# Patient Record
Sex: FEMALE | Race: WHITE | Employment: OTHER | URBAN - METROPOLITAN AREA
[De-identification: names, ages, dates, MRNs, and addresses within clinical notes are randomized per-mention and may not be internally consistent; named-entity substitution may affect disease eponyms.]

---

## 2018-08-31 ENCOUNTER — TRANSFERRED RECORDS (OUTPATIENT)
Dept: HEALTH INFORMATION MANAGEMENT | Facility: CLINIC | Age: 73
End: 2018-08-31

## 2018-11-29 ENCOUNTER — PRE VISIT (OUTPATIENT)
Dept: ORTHOPEDICS | Facility: CLINIC | Age: 73
End: 2018-11-29

## 2018-11-29 DIAGNOSIS — M25.571 PAIN IN JOINT INVOLVING ANKLE AND FOOT, RIGHT: Primary | ICD-10-CM

## 2018-11-29 NOTE — TELEPHONE ENCOUNTER
FUTURE VISIT INFORMATION      FUTURE VISIT INFORMATION:    Date: 12/3    Time: 1:00    [place] [facility] [place] INFORMATION:    Referring provider:      Referring providers clinic:      Reason for visit/diagnosis  2nd opinion R ankle replacement    RECORDS REQUESTED FROM:       Clinic name Comments Records Status Imaging Status   [facility] Requested records and images 11/28                                     RECORDS RECEIVED FROM: [facility]   DATE RECEIVED: 11/29   NOTES STATUS DETAILS   OFFICE NOTE from referring provider Received 8/31/18   INJECTIONS DONE IN RADIOLOGY Received 8/31/18   MRI N/A    CT SCAN N/A    XRAYS (IMAGES & REPORTS) Received 8/31/18

## 2018-12-03 ENCOUNTER — RADIANT APPOINTMENT (OUTPATIENT)
Dept: GENERAL RADIOLOGY | Facility: CLINIC | Age: 73
End: 2018-12-03
Attending: ORTHOPAEDIC SURGERY
Payer: MEDICARE

## 2018-12-03 ENCOUNTER — OFFICE VISIT (OUTPATIENT)
Dept: ORTHOPEDICS | Facility: CLINIC | Age: 73
End: 2018-12-03
Payer: MEDICARE

## 2018-12-03 VITALS — BODY MASS INDEX: 29.42 KG/M2 | WEIGHT: 176.6 LBS | HEIGHT: 65 IN

## 2018-12-03 DIAGNOSIS — M25.571 PAIN IN JOINT INVOLVING ANKLE AND FOOT, RIGHT: ICD-10-CM

## 2018-12-03 DIAGNOSIS — M19.071 PRIMARY OSTEOARTHRITIS OF RIGHT ANKLE: Primary | ICD-10-CM

## 2018-12-03 ASSESSMENT — ENCOUNTER SYMPTOMS
BACK PAIN: 1
JOINT SWELLING: 1
STIFFNESS: 1
TINGLING: 1
ARTHRALGIAS: 1
NUMBNESS: 1

## 2018-12-03 NOTE — NURSING NOTE
"Reason For Visit:   Chief Complaint   Patient presents with     Right Ankle - Consult       Ht 1.638 m (5' 4.5\")  Wt 80.1 kg (176 lb 9.6 oz)  BMI 29.85 kg/m2    Pain Assessment  Patient Currently in Pain: Yes  0-10 Pain Scale: 6  Primary Pain Location: Ankle  Pain Orientation: Right  Pain Descriptors: Radiating, Sharp  Aggravating Factors: Walking    Krishan Lira ATC  "

## 2018-12-03 NOTE — MR AVS SNAPSHOT
"              After Visit Summary   12/3/2018    Silke Francis    MRN: [id number]           Patient Information     Date Of Birth          1945        Visit Information        Provider Department      12/3/2018 1:00 PM Anson Garrison [facility]        Today's Diagnoses     Primary osteoarthritis of right ankle    -  1       Follow-ups after your visit        Future tests that were ordered for you today     Open Future Orders        Priority Expected Expires Ordered    CT Ankle Right w/o Contrast Routine  12/3/2019 12/3/2018            Who to contact     Please call your clinic at [phone] to:    Ask questions about your health    Make or cancel appointments    Discuss your medicines    Learn about your test results    Speak to your doctor            Additional Information About Your Visit        MyChart Information     Eachpalt is an electronic gateway that provides easy, online access to your medical records. With Ring, you can request a clinic appointment, read your test results, renew a prescription or communicate with your care team.     To sign up for Eachpalt visit the website at [email or web address]   You will be asked to enter the access code listed below, as well as some personal information. Please follow the directions to create your username and password.     Your access code is: [email or web address]  Expires: 2019  6:30 AM     Your access code will  in 90 days. If you need help or a new code, please contact your [facility] or call [phone] for assistance.        Care EveryWhere ID     This is your Care EveryWhere ID. This could be used by other organizations to access your [place] medical records  [id number]        Your Vitals Were     Height BMI (Body Mass Index)                1.638 m (5' 4.5\") 29.85 kg/m2           Blood Pressure from Last 3 Encounters:   No data found for BP    Weight from Last 3 Encounters: "   12/03/18 80.1 kg (176 lb 9.6 oz)              We Performed the Following     Barbara-Operative Worksheet (Default Surgery Request)          Today's Medication Changes          These changes are accurate as of 12/3/18  5:27 PM.  If you have any questions, ask your nurse or doctor.               Start taking these medicines.        Dose/Directions    order for DME   Used for:  Primary osteoarthritis of right ankle   Started by:  Anson Garrison MD        Roll-A-Bout Walker. Patient can use for 4 months   Quantity:  1 Units   Refills:  0            Where to get your medicines      Some of these will need a paper prescription and others can be bought over the counter.  Ask your nurse if you have questions.     Bring a paper prescription for each of these medications     order for DME                Primary Care Provider    None Specified       No primary provider on file.        Equal Access to Services     TRINI NATH : Angelita Chand, wadanielle navarro, qaybyakelin kaalmamaria t olivo, nicci barrera. So [facility] [phone].    ATENCIÓN: Si habla español, tiene a shane disposición servicios gratuitos de asistencia lingüística. Llame al [phone].    We comply with applicable federal civil rights laws and [place] laws. We do not discriminate on the basis of race, color, national origin, age, disability, sex, sexual orientation, or gender identity.            Thank you!     Thank you for choosing [facility]  for your care. Our goal is always to provide you with excellent care. Hearing back from our patients is one way we can continue to improve our services. Please take a few minutes to complete the written survey that you may receive in the mail after your visit with us. Thank you!             Your Updated Medication List - Protect others around you: Learn how to safely use, store and throw away your medicines at www.disposemymeds.org.          This list is accurate  as of 12/3/18  5:27 PM.  Always use your most recent med list.                   Brand Name Dispense Instructions for use Diagnosis    ASPIRIN PO      Take 81 mg by mouth        CELEBREX PO      Take 200 mg by mouth 2 times daily        ESTRADIOL PO      Take 1 mg by mouth        LANSOPRAZOLE PO      Take 30 mg by mouth        METOPROLOL TARTRATE PO      Take 100 mg by mouth        metoprolol-hydrochlorothiazide 25-12.5 MG Tb24 per tablet    DUTOPROL     Take 1 tablet by mouth daily        order for DME     1 Units    Roll-A-Bout Walker. Patient can use for 4 months    Primary osteoarthritis of right ankle       SERTRALINE HCL PO      Take 100 mg by mouth daily        SIMVASTATIN PO      Take 20 mg by mouth

## 2018-12-03 NOTE — PROGRESS NOTES
"CC: right ankle pain    HPI:  Silke is seen as a new patient 2nd opinion for her right ankle pain. She reports worsening right ankle pain for \"her whole life\". She remembers \"breaking her right ankle\" as a kid and being in a cast. She has had multiple injuries to her ankle and \"she rolls it all the time\". She has osteoarthritis to multiple joints and has been tested for autoimmune disease and is negative. She takes celebrex twice daily. She has seen another provider for her ankle this last August who recommended a total ankle replacement and gave her an ankle injection to which she reports \"provided no relief not even initially\". She admits she has no pain while at rest, but immediate pain with weight bearing. Rates her pain \"8 or 9\".     PMH: Reviewed in chart 12/3/2018 and is significant for type II DM, hypertension, high cholesterol and a history of a post op   DVT after her TKA in the past.     ROS: Reviewed on patient tablet 12/3/2018 with all systems negative except for those listed below    PE:  Pleasant and cooperative female alert and oriented x3. Arises from chair slowly. She has significant deformity and herberdens nodes to her fingers. Has bilateral total shoulder replacements that function well and do not cause pain. She is tender to palpate anterior lateral joint space. Mild effusion. Valgus hindfoot malalignment noted from posterior view. Full sensation to feet with pulses palpable. DF: 10 PF 16. Negative anterior drawer. Mild instability noted in inversion with muscle strength 4-/5. Eversion 5/5. Skin intact without rashes, brusies, or lesions. No pain to midfoot eversion/inversion and no signfiicant focal areas of midfoot pain.    Xrays taken today show severe degenerative changes to the tibiotalar joint with lateral talar tilt with no joint space. Significant subchondral sclerosis noted. No signfiicant changes noted to midfoot.    Dx:  1. Right ankle advanced grade IV bone on bone " osteoarthritis--tibiotalar joint    Plan:  1. Dr. Garrison discussed surgical options with Silke and recommended a right total ankle replacement. Risks, benefits, post op complications and prognosis were discussed in detail. She was told she would be limited WB up to 6 weeks and immobilized for that amount of time as well. She will schedule when she is ready to proceed.      Total time spent was 50 minutes with greater than 50% spent in face to face consultation and collaboration of care.  Answers for HPI/ROS submitted by the patient on 12/3/2018   General Symptoms: No  Skin Symptoms: No  HENT Symptoms: No  EYE SYMPTOMS: No  HEART SYMPTOMS: No  LUNG SYMPTOMS: No  INTESTINAL SYMPTOMS: No  URINARY SYMPTOMS: No  GYNECOLOGIC SYMPTOMS: No  BREAST SYMPTOMS: No  SKELETAL SYMPTOMS: Yes  BLOOD SYMPTOMS: No  NERVOUS SYSTEM SYMPTOMS: Yes  MENTAL HEALTH SYMPTOMS: No  Back pain: Yes  Swollen joints: Yes  Joint pain: Yes  Joint stiffness: Yes  Tingling: Yes  Difficulty walking: Yes  Numbness: Yes    I, Dr. Anson Garrison, have seen and examined this patient with KEVIN Knight, CNP.

## 2018-12-03 NOTE — NURSING NOTE
Teaching Flowsheet   Relevant Diagnosis: osteoarthritis, right ankle  Teaching Topic: preop RTAA with tendoachilles lengthening    Silke lives alone in [place] with family nearby to help as needed.  Stopped smoking 30 yrs ago, retired, history bilateral TSA 2013 and 2015, Rt TKA 2011, spinal fusions L4-5 and L5-S1 in 2013.  She requested knee scooter for her postop time when no walking on her total ankle due to hand arthritis and inability to use crutches or walker, Rx given to pt which she will take to [facility] facility.  Preop CT was ordered.  Pt will call to schedule as it gets closer to her May 2019 surgery date.     Person(s) involved in teaching:   Patient and Niece     Motivation Level:  Asks Questions: Yes  Eager to Learn: Yes  Cooperative: Yes  Receptive (willing/able to accept information): Yes  Any cultural factors/Rastafarian beliefs that may influence understanding or compliance? No  Comments:      Patient and Family demonstrates understanding of the following:  Reason for the appointment, diagnosis and treatment plan: Yes  Knowledge of proper use of medications and conditions for which they are ordered (with special attention to potential side effects or drug interactions): Yes  Which situations necessitate calling provider and whom to contact: Yes       Teaching Concerns Addressed:   Comments:      Proper use and care of ortho boot (medical equip, care aids, etc.): Yes  Nutritional needs and diet plan: Yes  Pain management techniques: Yes  Wound Care: Yes  How and/when to access community resources: Yes     Instructional Materials Used/Given: preop pkt TAA handout, antiseptic soap     Time spent with patient: 30 minutes.

## 2018-12-03 NOTE — LETTER
Date:December 4, 2018      Patient was self referred, no letter generated. Do not send.        [facility] Information

## 2018-12-03 NOTE — LETTER
"12/3/2018       RE: Silke Francis  [address] [facility] [address]     Dear Colleague,    Thank you for referring your patient, Silke Francis, [facility]. Please see a copy of my visit note below.    CC: right ankle pain    HPI:  Silke is seen as a new patient 2nd opinion for her right ankle pain. She reports worsening right ankle pain for \"her whole life\". She remembers \"breaking her right ankle\" as a kid and being in a cast. She has had multiple injuries to her ankle and \"she rolls it all the time\". She has osteoarthritis to multiple joints and has been tested for autoimmune disease and is negative. She takes celebrex twice daily. She has seen another provider for her ankle this last August who recommended a total ankle replacement and gave her an ankle injection to which she reports \"provided no relief not even initially\". She admits she has no pain while at rest, but immediate pain with weight bearing. Rates her pain \"8 or 9\".     PMH: Reviewed in chart 12/3/2018 and is significant for type II DM, hypertension, high cholesterol and a history of a post op   DVT after her TKA in the past.     ROS: Reviewed on patient tablet 12/3/2018 with all systems negative except for those listed below    PE:  Pleasant and cooperative female alert and oriented x3. Arises from chair slowly. She has significant deformity and herberdens nodes to her fingers. Has bilateral total shoulder replacements that function well and do not cause pain. She is tender to palpate anterior lateral joint space. Mild effusion. Valgus hindfoot malalignment noted from posterior view. Full sensation to feet with pulses palpable. DF: 10 PF 16. Negative anterior drawer. Mild instability noted in inversion with muscle strength 4-/5. Eversion 5/5. Skin intact without rashes, brusies, or lesions. No pain to midfoot eversion/inversion and no signfiicant focal areas of " midfoot pain.    Xrays taken today show severe degenerative changes to the tibiotalar joint with lateral talar tilt with no joint space. Significant subchondral sclerosis noted. No signfiicant changes noted to midfoot.    Dx:  1. Right ankle advanced grade IV bone on bone osteoarthritis--tibiotalar joint    Plan:  1. Dr. Garrison discussed surgical options with Silke and recommended a right total ankle replacement. Risks, benefits, post op complications and prognosis were discussed in detail. She was told she would be limited WB up to 6 weeks and immobilized for that amount of time as well. She will schedule when she is ready to proceed.      Total time spent was 50 minutes with greater than 50% spent in face to face consultation and collaboration of care.  Answers for HPI/ROS submitted by the patient on 12/3/2018   General Symptoms: No  Skin Symptoms: No  HENT Symptoms: No  EYE SYMPTOMS: No  HEART SYMPTOMS: No  LUNG SYMPTOMS: No  INTESTINAL SYMPTOMS: No  URINARY SYMPTOMS: No  GYNECOLOGIC SYMPTOMS: No  BREAST SYMPTOMS: No  SKELETAL SYMPTOMS: Yes  BLOOD SYMPTOMS: No  NERVOUS SYSTEM SYMPTOMS: Yes  MENTAL HEALTH SYMPTOMS: No  Back pain: Yes  Swollen joints: Yes  Joint pain: Yes  Joint stiffness: Yes  Tingling: Yes  Difficulty walking: Yes  Numbness: Yes    I, Dr. Anson Garrison, have seen and examined this patient with KEVIN Knight, CNP.      Again, thank you for allowing me to participate in the care of your patient.      Sincerely,    Anson Garrison MD

## 2019-04-15 ENCOUNTER — TELEPHONE (OUTPATIENT)
Dept: ORTHOPEDICS | Facility: CLINIC | Age: 74
End: 2019-04-15

## 2019-04-15 NOTE — TELEPHONE ENCOUNTER
Phoned patient to let her know that due to a delay in completing her CT scan, her surgery with Dr. Garrison will need to be pushed back. Patient has been rescheduled from 5/2/19 to 5/23/19. Patient plans to call to reschedule her CT to an earlier date, and hope that she can move up her surgery. Patient currently scheduled for her CT scan for 4/25/19. Patient will call me with any changes.

## 2019-04-18 ENCOUNTER — ANCILLARY PROCEDURE (OUTPATIENT)
Dept: CT IMAGING | Facility: CLINIC | Age: 74
End: 2019-04-18
Attending: ORTHOPAEDIC SURGERY
Payer: MEDICARE

## 2019-04-18 DIAGNOSIS — M19.071 PRIMARY OSTEOARTHRITIS OF RIGHT ANKLE: ICD-10-CM

## 2019-05-07 ENCOUNTER — TELEPHONE (OUTPATIENT)
Dept: ORTHOPEDICS | Facility: CLINIC | Age: 74
End: 2019-05-07

## 2019-05-07 NOTE — TELEPHONE ENCOUNTER
Patient informed that it is ok to leave acrylic nails on however there is a risk of one being removed to check if ear lobe or toe does not work. Patient ok with plan and thanked me for calling.     Cece Freeman CMA

## 2019-05-07 NOTE — TELEPHONE ENCOUNTER
[facility]    Phone Message    May a detailed message be left on voicemail: yes    Reason for Call: Other: Pt has surgery scheduled with Dr. Garrison on 5/23 and she is needing to verify if it is ok to keep her acrylic nails on during the surgery. She stated she is aware of the no polish but she stated for another surgery she had she could keep them on, but wanted to make sure for this one      Action Taken: Message routed to:  [facility] ortho

## 2019-05-21 RX ORDER — LEVOTHYROXINE SODIUM 112 UG/1
112 TABLET ORAL EVERY MORNING
COMMUNITY

## 2019-05-23 ENCOUNTER — HOSPITAL ENCOUNTER (INPATIENT)
Facility: CLINIC | Age: 74
LOS: 2 days | Discharge: HOME HEALTH | DRG: 469 | End: 2019-05-25
Attending: ORTHOPAEDIC SURGERY | Admitting: ORTHOPAEDIC SURGERY
Payer: MEDICARE

## 2019-05-23 ENCOUNTER — APPOINTMENT (OUTPATIENT)
Dept: GENERAL RADIOLOGY | Facility: CLINIC | Age: 74
DRG: 469 | End: 2019-05-23
Attending: ORTHOPAEDIC SURGERY
Payer: MEDICARE

## 2019-05-23 ENCOUNTER — ANESTHESIA (OUTPATIENT)
Dept: SURGERY | Facility: CLINIC | Age: 74
DRG: 469 | End: 2019-05-23
Payer: MEDICARE

## 2019-05-23 ENCOUNTER — ANESTHESIA EVENT (OUTPATIENT)
Dept: SURGERY | Facility: CLINIC | Age: 74
DRG: 469 | End: 2019-05-23
Payer: MEDICARE

## 2019-05-23 DIAGNOSIS — Z98.890 STATUS POST SURGERY: Primary | ICD-10-CM

## 2019-05-23 LAB
ABO + RH BLD: NORMAL
ABO + RH BLD: NORMAL
ALBUMIN UR-MCNC: NEGATIVE MG/DL
APPEARANCE UR: CLEAR
BILIRUB UR QL STRIP: NEGATIVE
BLD GP AB SCN SERPL QL: NORMAL
BLOOD BANK CMNT PATIENT-IMP: NORMAL
COLOR UR AUTO: YELLOW
GLUCOSE BLDC GLUCOMTR-MCNC: 114 MG/DL (ref 70–99)
GLUCOSE BLDC GLUCOMTR-MCNC: 124 MG/DL (ref 70–99)
GLUCOSE BLDC GLUCOMTR-MCNC: 95 MG/DL (ref 70–99)
GLUCOSE SERPL-MCNC: 109 MG/DL (ref 70–99)
GLUCOSE UR STRIP-MCNC: NEGATIVE MG/DL
HGB BLD-MCNC: 14.4 G/DL (ref 11.7–15.7)
HGB UR QL STRIP: NEGATIVE
HYALINE CASTS #/AREA URNS LPF: 4 /LPF (ref 0–2)
KETONES UR STRIP-MCNC: NEGATIVE MG/DL
LEUKOCYTE ESTERASE UR QL STRIP: ABNORMAL
MUCOUS THREADS #/AREA URNS LPF: PRESENT /LPF
NITRATE UR QL: NEGATIVE
PH UR STRIP: 6.5 PH (ref 5–7)
POTASSIUM SERPL-SCNC: 3.8 MMOL/L (ref 3.4–5.3)
RBC #/AREA URNS AUTO: 1 /HPF (ref 0–2)
SOURCE: ABNORMAL
SP GR UR STRIP: 1.01 (ref 1–1.03)
SPECIMEN EXP DATE BLD: NORMAL
SQUAMOUS #/AREA URNS AUTO: 5 /HPF (ref 0–1)
UROBILINOGEN UR STRIP-MCNC: NORMAL MG/DL (ref 0–2)
WBC #/AREA URNS AUTO: 1 /HPF (ref 0–5)

## 2019-05-23 PROCEDURE — 25800030 ZZH RX IP 258 OP 636: Performed by: ORTHOPAEDIC SURGERY

## 2019-05-23 PROCEDURE — 00000146 ZZHCL STATISTIC GLUCOSE BY METER IP

## 2019-05-23 PROCEDURE — 99207 ZZC APP CREDIT; MD BILLING SHARED VISIT: CPT | Performed by: PHYSICIAN ASSISTANT

## 2019-05-23 PROCEDURE — 36000066 ZZH SURGERY LEVEL 4 W FLUORO 1ST 30 MIN - UMMC: Performed by: ORTHOPAEDIC SURGERY

## 2019-05-23 PROCEDURE — 71000014 ZZH RECOVERY PHASE 1 LEVEL 2 FIRST HR: Performed by: ORTHOPAEDIC SURGERY

## 2019-05-23 PROCEDURE — 25000128 H RX IP 250 OP 636: Performed by: STUDENT IN AN ORGANIZED HEALTH CARE EDUCATION/TRAINING PROGRAM

## 2019-05-23 PROCEDURE — 86901 BLOOD TYPING SEROLOGIC RH(D): CPT | Performed by: ORTHOPAEDIC SURGERY

## 2019-05-23 PROCEDURE — 40000278 XR SURGERY CARM FLUORO LESS THAN 5 MIN: Mod: TC

## 2019-05-23 PROCEDURE — A9270 NON-COVERED ITEM OR SERVICE: HCPCS | Performed by: STUDENT IN AN ORGANIZED HEALTH CARE EDUCATION/TRAINING PROGRAM

## 2019-05-23 PROCEDURE — 25000128 H RX IP 250 OP 636: Performed by: ORTHOPAEDIC SURGERY

## 2019-05-23 PROCEDURE — 0SRF0J9 REPLACEMENT OF RIGHT ANKLE JOINT WITH SYNTHETIC SUBSTITUTE, CEMENTED, OPEN APPROACH: ICD-10-PCS | Performed by: ORTHOPAEDIC SURGERY

## 2019-05-23 PROCEDURE — 84132 ASSAY OF SERUM POTASSIUM: CPT | Performed by: ANESTHESIOLOGY

## 2019-05-23 PROCEDURE — 25000132 ZZH RX MED GY IP 250 OP 250 PS 637: Performed by: ANESTHESIOLOGY

## 2019-05-23 PROCEDURE — 0L8N0ZZ DIVISION OF RIGHT LOWER LEG TENDON, OPEN APPROACH: ICD-10-PCS | Performed by: ORTHOPAEDIC SURGERY

## 2019-05-23 PROCEDURE — 37000008 ZZH ANESTHESIA TECHNICAL FEE, 1ST 30 MIN: Performed by: ORTHOPAEDIC SURGERY

## 2019-05-23 PROCEDURE — 25000125 ZZHC RX 250: Performed by: ORTHOPAEDIC SURGERY

## 2019-05-23 PROCEDURE — 37000009 ZZH ANESTHESIA TECHNICAL FEE, EACH ADDTL 15 MIN: Performed by: ORTHOPAEDIC SURGERY

## 2019-05-23 PROCEDURE — 25000132 ZZH RX MED GY IP 250 OP 250 PS 637: Performed by: STUDENT IN AN ORGANIZED HEALTH CARE EDUCATION/TRAINING PROGRAM

## 2019-05-23 PROCEDURE — 40000170 ZZH STATISTIC PRE-PROCEDURE ASSESSMENT II: Performed by: ORTHOPAEDIC SURGERY

## 2019-05-23 PROCEDURE — 99222 1ST HOSP IP/OBS MODERATE 55: CPT | Performed by: INTERNAL MEDICINE

## 2019-05-23 PROCEDURE — 25000132 ZZH RX MED GY IP 250 OP 250 PS 637: Performed by: ORTHOPAEDIC SURGERY

## 2019-05-23 PROCEDURE — 99207 ZZC CDG-CODE CATEGORY CHANGED: CPT | Performed by: INTERNAL MEDICINE

## 2019-05-23 PROCEDURE — 25000566 ZZH SEVOFLURANE, EA 15 MIN: Performed by: ORTHOPAEDIC SURGERY

## 2019-05-23 PROCEDURE — 25800030 ZZH RX IP 258 OP 636: Performed by: STUDENT IN AN ORGANIZED HEALTH CARE EDUCATION/TRAINING PROGRAM

## 2019-05-23 PROCEDURE — A9270 NON-COVERED ITEM OR SERVICE: HCPCS | Performed by: ORTHOPAEDIC SURGERY

## 2019-05-23 PROCEDURE — 27211024 ZZHC OR SUPPLY OTHER OPNP: Performed by: ORTHOPAEDIC SURGERY

## 2019-05-23 PROCEDURE — 36000064 ZZH SURGERY LEVEL 4 EA 15 ADDTL MIN - UMMC: Performed by: ORTHOPAEDIC SURGERY

## 2019-05-23 PROCEDURE — 25000128 H RX IP 250 OP 636: Performed by: ANESTHESIOLOGY

## 2019-05-23 PROCEDURE — 25000132 ZZH RX MED GY IP 250 OP 250 PS 637: Performed by: PHYSICIAN ASSISTANT

## 2019-05-23 PROCEDURE — 27210794 ZZH OR GENERAL SUPPLY STERILE: Performed by: ORTHOPAEDIC SURGERY

## 2019-05-23 PROCEDURE — 25800030 ZZH RX IP 258 OP 636: Performed by: NURSE ANESTHETIST, CERTIFIED REGISTERED

## 2019-05-23 PROCEDURE — 25000128 H RX IP 250 OP 636: Performed by: NURSE ANESTHETIST, CERTIFIED REGISTERED

## 2019-05-23 PROCEDURE — 86900 BLOOD TYPING SEROLOGIC ABO: CPT | Performed by: ORTHOPAEDIC SURGERY

## 2019-05-23 PROCEDURE — 12000001 ZZH R&B MED SURG/OB UMMC

## 2019-05-23 PROCEDURE — A9270 NON-COVERED ITEM OR SERVICE: HCPCS | Performed by: ANESTHESIOLOGY

## 2019-05-23 PROCEDURE — 25000125 ZZHC RX 250: Performed by: NURSE ANESTHETIST, CERTIFIED REGISTERED

## 2019-05-23 PROCEDURE — 82947 ASSAY GLUCOSE BLOOD QUANT: CPT | Performed by: ANESTHESIOLOGY

## 2019-05-23 PROCEDURE — 36415 COLL VENOUS BLD VENIPUNCTURE: CPT | Performed by: ORTHOPAEDIC SURGERY

## 2019-05-23 PROCEDURE — 86850 RBC ANTIBODY SCREEN: CPT | Performed by: ORTHOPAEDIC SURGERY

## 2019-05-23 PROCEDURE — C1776 JOINT DEVICE (IMPLANTABLE): HCPCS | Performed by: ORTHOPAEDIC SURGERY

## 2019-05-23 PROCEDURE — C9290 INJ, BUPIVACAINE LIPOSOME: HCPCS | Performed by: ANESTHESIOLOGY

## 2019-05-23 PROCEDURE — 85018 HEMOGLOBIN: CPT | Performed by: ANESTHESIOLOGY

## 2019-05-23 PROCEDURE — 25000125 ZZHC RX 250: Performed by: ANESTHESIOLOGY

## 2019-05-23 PROCEDURE — 81001 URINALYSIS AUTO W/SCOPE: CPT | Performed by: ORTHOPAEDIC SURGERY

## 2019-05-23 DEVICE — IMPLANTABLE DEVICE: Type: IMPLANTABLE DEVICE | Site: ANKLE | Status: FUNCTIONAL

## 2019-05-23 RX ORDER — METOCLOPRAMIDE HYDROCHLORIDE 5 MG/ML
5 INJECTION INTRAMUSCULAR; INTRAVENOUS EVERY 6 HOURS PRN
Status: DISCONTINUED | OUTPATIENT
Start: 2019-05-23 | End: 2019-05-25 | Stop reason: HOSPADM

## 2019-05-23 RX ORDER — CEFAZOLIN SODIUM 2 G/100ML
2 INJECTION, SOLUTION INTRAVENOUS
Status: COMPLETED | OUTPATIENT
Start: 2019-05-23 | End: 2019-05-23

## 2019-05-23 RX ORDER — HYDROCHLOROTHIAZIDE 12.5 MG/1
25 TABLET ORAL
COMMUNITY

## 2019-05-23 RX ORDER — NALOXONE HYDROCHLORIDE 0.4 MG/ML
.1-.4 INJECTION, SOLUTION INTRAMUSCULAR; INTRAVENOUS; SUBCUTANEOUS
Status: DISCONTINUED | OUTPATIENT
Start: 2019-05-23 | End: 2019-05-25 | Stop reason: HOSPADM

## 2019-05-23 RX ORDER — SODIUM CHLORIDE, SODIUM LACTATE, POTASSIUM CHLORIDE, CALCIUM CHLORIDE 600; 310; 30; 20 MG/100ML; MG/100ML; MG/100ML; MG/100ML
INJECTION, SOLUTION INTRAVENOUS CONTINUOUS
Status: DISCONTINUED | OUTPATIENT
Start: 2019-05-23 | End: 2019-05-23 | Stop reason: HOSPADM

## 2019-05-23 RX ORDER — LANOLIN ALCOHOL/MO/W.PET/CERES
3 CREAM (GRAM) TOPICAL
Status: DISCONTINUED | OUTPATIENT
Start: 2019-05-23 | End: 2019-05-25 | Stop reason: HOSPADM

## 2019-05-23 RX ORDER — SIMVASTATIN 10 MG
20 TABLET ORAL AT BEDTIME
Status: DISCONTINUED | OUTPATIENT
Start: 2019-05-23 | End: 2019-05-25 | Stop reason: HOSPADM

## 2019-05-23 RX ORDER — SERTRALINE HYDROCHLORIDE 100 MG/1
100 TABLET, FILM COATED ORAL EVERY MORNING
Status: DISCONTINUED | OUTPATIENT
Start: 2019-05-24 | End: 2019-05-25 | Stop reason: HOSPADM

## 2019-05-23 RX ORDER — ASPIRIN 325 MG
325 TABLET, DELAYED RELEASE (ENTERIC COATED) ORAL DAILY
Qty: 28 TABLET | Refills: 0 | Status: SHIPPED | OUTPATIENT
Start: 2019-05-24

## 2019-05-23 RX ORDER — ACETAMINOPHEN 325 MG/1
650 TABLET ORAL EVERY 4 HOURS PRN
Status: DISCONTINUED | OUTPATIENT
Start: 2019-05-26 | End: 2019-05-25 | Stop reason: HOSPADM

## 2019-05-23 RX ORDER — ACETAMINOPHEN 325 MG/1
650 TABLET ORAL EVERY 4 HOURS PRN
Qty: 60 TABLET | Refills: 0 | Status: SHIPPED | OUTPATIENT
Start: 2019-05-26

## 2019-05-23 RX ORDER — PROCHLORPERAZINE MALEATE 5 MG
5 TABLET ORAL EVERY 6 HOURS PRN
Status: DISCONTINUED | OUTPATIENT
Start: 2019-05-23 | End: 2019-05-25 | Stop reason: HOSPADM

## 2019-05-23 RX ORDER — SODIUM CHLORIDE, SODIUM LACTATE, POTASSIUM CHLORIDE, CALCIUM CHLORIDE 600; 310; 30; 20 MG/100ML; MG/100ML; MG/100ML; MG/100ML
INJECTION, SOLUTION INTRAVENOUS CONTINUOUS PRN
Status: DISCONTINUED | OUTPATIENT
Start: 2019-05-23 | End: 2019-05-23

## 2019-05-23 RX ORDER — OXYCODONE HYDROCHLORIDE 5 MG/1
5 TABLET ORAL EVERY 4 HOURS PRN
Status: DISCONTINUED | OUTPATIENT
Start: 2019-05-23 | End: 2019-05-23

## 2019-05-23 RX ORDER — LIDOCAINE 40 MG/G
CREAM TOPICAL
Status: DISCONTINUED | OUTPATIENT
Start: 2019-05-23 | End: 2019-05-25 | Stop reason: HOSPADM

## 2019-05-23 RX ORDER — MEPERIDINE HYDROCHLORIDE 25 MG/ML
12.5 INJECTION INTRAMUSCULAR; INTRAVENOUS; SUBCUTANEOUS
Status: DISCONTINUED | OUTPATIENT
Start: 2019-05-23 | End: 2019-05-23 | Stop reason: HOSPADM

## 2019-05-23 RX ORDER — OXYCODONE HYDROCHLORIDE 5 MG/1
5-10 TABLET ORAL
Qty: 30 TABLET | Refills: 0 | Status: SHIPPED | OUTPATIENT
Start: 2019-05-23 | End: 2019-06-06

## 2019-05-23 RX ORDER — ONDANSETRON 2 MG/ML
INJECTION INTRAMUSCULAR; INTRAVENOUS PRN
Status: DISCONTINUED | OUTPATIENT
Start: 2019-05-23 | End: 2019-05-23

## 2019-05-23 RX ORDER — AMOXICILLIN 250 MG
1 CAPSULE ORAL 2 TIMES DAILY
Qty: 20 TABLET | Refills: 0 | Status: SHIPPED | OUTPATIENT
Start: 2019-05-23

## 2019-05-23 RX ORDER — GLYCOPYRROLATE 0.2 MG/ML
INJECTION, SOLUTION INTRAMUSCULAR; INTRAVENOUS PRN
Status: DISCONTINUED | OUTPATIENT
Start: 2019-05-23 | End: 2019-05-23

## 2019-05-23 RX ORDER — AMOXICILLIN 250 MG
1 CAPSULE ORAL 2 TIMES DAILY
Status: DISCONTINUED | OUTPATIENT
Start: 2019-05-23 | End: 2019-05-25 | Stop reason: HOSPADM

## 2019-05-23 RX ORDER — ESTRADIOL 1 MG/1
1 TABLET ORAL EVERY MORNING
Status: DISCONTINUED | OUTPATIENT
Start: 2019-05-24 | End: 2019-05-25 | Stop reason: HOSPADM

## 2019-05-23 RX ORDER — PHENYLEPHRINE HCL IN 0.9% NACL 1 MG/10 ML
SYRINGE (ML) INTRAVENOUS PRN
Status: DISCONTINUED | OUTPATIENT
Start: 2019-05-23 | End: 2019-05-23

## 2019-05-23 RX ORDER — LIDOCAINE 40 MG/G
CREAM TOPICAL
Status: DISCONTINUED | OUTPATIENT
Start: 2019-05-23 | End: 2019-05-23 | Stop reason: HOSPADM

## 2019-05-23 RX ORDER — ACETAMINOPHEN 325 MG/1
975 TABLET ORAL ONCE
Status: COMPLETED | OUTPATIENT
Start: 2019-05-23 | End: 2019-05-23

## 2019-05-23 RX ORDER — METOPROLOL TARTRATE 50 MG
50 TABLET ORAL 2 TIMES DAILY
Status: DISCONTINUED | OUTPATIENT
Start: 2019-05-23 | End: 2019-05-25 | Stop reason: HOSPADM

## 2019-05-23 RX ORDER — AMOXICILLIN 250 MG
2 CAPSULE ORAL 2 TIMES DAILY
Status: DISCONTINUED | OUTPATIENT
Start: 2019-05-23 | End: 2019-05-25 | Stop reason: HOSPADM

## 2019-05-23 RX ORDER — FLUMAZENIL 0.1 MG/ML
0.2 INJECTION, SOLUTION INTRAVENOUS
Status: DISCONTINUED | OUTPATIENT
Start: 2019-05-23 | End: 2019-05-23 | Stop reason: HOSPADM

## 2019-05-23 RX ORDER — LANSOPRAZOLE 30 MG/1
30 CAPSULE, DELAYED RELEASE ORAL
Status: DISCONTINUED | OUTPATIENT
Start: 2019-05-24 | End: 2019-05-25 | Stop reason: HOSPADM

## 2019-05-23 RX ORDER — CEFAZOLIN SODIUM 1 G/3ML
1 INJECTION, POWDER, FOR SOLUTION INTRAMUSCULAR; INTRAVENOUS SEE ADMIN INSTRUCTIONS
Status: DISCONTINUED | OUTPATIENT
Start: 2019-05-23 | End: 2019-05-23 | Stop reason: HOSPADM

## 2019-05-23 RX ORDER — FENTANYL CITRATE 50 UG/ML
25-50 INJECTION, SOLUTION INTRAMUSCULAR; INTRAVENOUS EVERY 5 MIN PRN
Status: DISCONTINUED | OUTPATIENT
Start: 2019-05-23 | End: 2019-05-23 | Stop reason: HOSPADM

## 2019-05-23 RX ORDER — NALOXONE HYDROCHLORIDE 0.4 MG/ML
.1-.4 INJECTION, SOLUTION INTRAMUSCULAR; INTRAVENOUS; SUBCUTANEOUS
Status: DISCONTINUED | OUTPATIENT
Start: 2019-05-23 | End: 2019-05-23 | Stop reason: HOSPADM

## 2019-05-23 RX ORDER — CEFAZOLIN SODIUM 1 G/3ML
1 INJECTION, POWDER, FOR SOLUTION INTRAMUSCULAR; INTRAVENOUS EVERY 8 HOURS
Status: COMPLETED | OUTPATIENT
Start: 2019-05-23 | End: 2019-05-24

## 2019-05-23 RX ORDER — ACETAMINOPHEN 325 MG/1
975 TABLET ORAL EVERY 8 HOURS
Status: DISCONTINUED | OUTPATIENT
Start: 2019-05-23 | End: 2019-05-25 | Stop reason: HOSPADM

## 2019-05-23 RX ORDER — CALCIUM CARBONATE 500 MG/1
500 TABLET, CHEWABLE ORAL DAILY PRN
Status: DISCONTINUED | OUTPATIENT
Start: 2019-05-23 | End: 2019-05-25 | Stop reason: HOSPADM

## 2019-05-23 RX ORDER — BUPIVACAINE HYDROCHLORIDE AND EPINEPHRINE 5; 5 MG/ML; UG/ML
INJECTION, SOLUTION PERINEURAL PRN
Status: DISCONTINUED | OUTPATIENT
Start: 2019-05-23 | End: 2019-05-23 | Stop reason: HOSPADM

## 2019-05-23 RX ORDER — ONDANSETRON 2 MG/ML
4 INJECTION INTRAMUSCULAR; INTRAVENOUS EVERY 30 MIN PRN
Status: DISCONTINUED | OUTPATIENT
Start: 2019-05-23 | End: 2019-05-23 | Stop reason: HOSPADM

## 2019-05-23 RX ORDER — EPHEDRINE SULFATE 50 MG/ML
INJECTION, SOLUTION INTRAMUSCULAR; INTRAVENOUS; SUBCUTANEOUS PRN
Status: DISCONTINUED | OUTPATIENT
Start: 2019-05-23 | End: 2019-05-23

## 2019-05-23 RX ORDER — HYDROCHLOROTHIAZIDE 25 MG/1
25 TABLET ORAL
Status: DISCONTINUED | OUTPATIENT
Start: 2019-05-23 | End: 2019-05-23

## 2019-05-23 RX ORDER — METOCLOPRAMIDE 5 MG/1
5 TABLET ORAL EVERY 6 HOURS PRN
Status: DISCONTINUED | OUTPATIENT
Start: 2019-05-23 | End: 2019-05-25 | Stop reason: HOSPADM

## 2019-05-23 RX ORDER — ONDANSETRON 4 MG/1
4 TABLET, ORALLY DISINTEGRATING ORAL EVERY 30 MIN PRN
Status: DISCONTINUED | OUTPATIENT
Start: 2019-05-23 | End: 2019-05-23 | Stop reason: HOSPADM

## 2019-05-23 RX ORDER — BUPIVACAINE HYDROCHLORIDE AND EPINEPHRINE 2.5; 5 MG/ML; UG/ML
INJECTION, SOLUTION INFILTRATION; PERINEURAL PRN
Status: DISCONTINUED | OUTPATIENT
Start: 2019-05-23 | End: 2019-05-23

## 2019-05-23 RX ORDER — OXYCODONE HYDROCHLORIDE 5 MG/1
5-10 TABLET ORAL
Status: DISCONTINUED | OUTPATIENT
Start: 2019-05-23 | End: 2019-05-25 | Stop reason: HOSPADM

## 2019-05-23 RX ORDER — SODIUM CHLORIDE 9 MG/ML
INJECTION, SOLUTION INTRAVENOUS CONTINUOUS
Status: DISCONTINUED | OUTPATIENT
Start: 2019-05-23 | End: 2019-05-25 | Stop reason: HOSPADM

## 2019-05-23 RX ORDER — CELECOXIB 200 MG/1
200 CAPSULE ORAL ONCE
Status: COMPLETED | OUTPATIENT
Start: 2019-05-23 | End: 2019-05-23

## 2019-05-23 RX ORDER — HYDROMORPHONE HYDROCHLORIDE 1 MG/ML
.3-.5 INJECTION, SOLUTION INTRAMUSCULAR; INTRAVENOUS; SUBCUTANEOUS
Status: DISCONTINUED | OUTPATIENT
Start: 2019-05-23 | End: 2019-05-25 | Stop reason: HOSPADM

## 2019-05-23 RX ORDER — KETOROLAC TROMETHAMINE 30 MG/ML
INJECTION, SOLUTION INTRAMUSCULAR; INTRAVENOUS PRN
Status: DISCONTINUED | OUTPATIENT
Start: 2019-05-23 | End: 2019-05-23

## 2019-05-23 RX ORDER — GABAPENTIN 100 MG/1
100 CAPSULE ORAL
Status: COMPLETED | OUTPATIENT
Start: 2019-05-23 | End: 2019-05-23

## 2019-05-23 RX ORDER — OXYCODONE HYDROCHLORIDE 5 MG/1
5-10 TABLET ORAL EVERY 4 HOURS PRN
Status: DISCONTINUED | OUTPATIENT
Start: 2019-05-23 | End: 2019-05-23

## 2019-05-23 RX ORDER — FENTANYL CITRATE 50 UG/ML
25-50 INJECTION, SOLUTION INTRAMUSCULAR; INTRAVENOUS
Status: DISCONTINUED | OUTPATIENT
Start: 2019-05-23 | End: 2019-05-23

## 2019-05-23 RX ORDER — PROPOFOL 10 MG/ML
INJECTION, EMULSION INTRAVENOUS PRN
Status: DISCONTINUED | OUTPATIENT
Start: 2019-05-23 | End: 2019-05-23

## 2019-05-23 RX ORDER — HYDROXYZINE HYDROCHLORIDE 25 MG/1
25 TABLET, FILM COATED ORAL EVERY 6 HOURS PRN
Status: DISCONTINUED | OUTPATIENT
Start: 2019-05-23 | End: 2019-05-25 | Stop reason: HOSPADM

## 2019-05-23 RX ORDER — METOPROLOL TARTRATE 50 MG
100 TABLET ORAL 2 TIMES DAILY
Status: DISCONTINUED | OUTPATIENT
Start: 2019-05-23 | End: 2019-05-23

## 2019-05-23 RX ORDER — SODIUM CHLORIDE 9 MG/ML
INJECTION, SOLUTION INTRAVENOUS
Status: DISPENSED
Start: 2019-05-23 | End: 2019-05-24

## 2019-05-23 RX ORDER — ACETAMINOPHEN 325 MG/1
975 TABLET ORAL ONCE
Status: DISCONTINUED | OUTPATIENT
Start: 2019-05-23 | End: 2019-05-23 | Stop reason: HOSPADM

## 2019-05-23 RX ADMIN — CELECOXIB 200 MG: 200 CAPSULE ORAL at 06:48

## 2019-05-23 RX ADMIN — PROPOFOL 30 MG: 10 INJECTION, EMULSION INTRAVENOUS at 09:53

## 2019-05-23 RX ADMIN — RANITIDINE 150 MG: 150 TABLET ORAL at 22:20

## 2019-05-23 RX ADMIN — CEFAZOLIN 1 G: 1 INJECTION, POWDER, FOR SOLUTION INTRAMUSCULAR; INTRAVENOUS at 17:24

## 2019-05-23 RX ADMIN — METFORMIN HYDROCHLORIDE 500 MG: 500 TABLET ORAL at 17:23

## 2019-05-23 RX ADMIN — SODIUM CHLORIDE, POTASSIUM CHLORIDE, SODIUM LACTATE AND CALCIUM CHLORIDE: 600; 310; 30; 20 INJECTION, SOLUTION INTRAVENOUS at 08:02

## 2019-05-23 RX ADMIN — PROPOFOL 170 MG: 10 INJECTION, EMULSION INTRAVENOUS at 08:12

## 2019-05-23 RX ADMIN — ACETAMINOPHEN 975 MG: 325 TABLET, FILM COATED ORAL at 17:23

## 2019-05-23 RX ADMIN — AMITRIPTYLINE HYDROCHLORIDE 25 MG: 25 TABLET, FILM COATED ORAL at 22:20

## 2019-05-23 RX ADMIN — OXYCODONE HYDROCHLORIDE 5 MG: 5 TABLET ORAL at 22:20

## 2019-05-23 RX ADMIN — SODIUM CHLORIDE: 9 INJECTION, SOLUTION INTRAVENOUS at 17:23

## 2019-05-23 RX ADMIN — CEFAZOLIN SODIUM 2 G: 2 INJECTION, SOLUTION INTRAVENOUS at 08:22

## 2019-05-23 RX ADMIN — GLYCOPYRROLATE 0.2 MG: 0.2 INJECTION, SOLUTION INTRAMUSCULAR; INTRAVENOUS at 08:32

## 2019-05-23 RX ADMIN — SENNOSIDES AND DOCUSATE SODIUM 2 TABLET: 8.6; 5 TABLET ORAL at 22:19

## 2019-05-23 RX ADMIN — ONDANSETRON 4 MG: 2 INJECTION INTRAMUSCULAR; INTRAVENOUS at 10:00

## 2019-05-23 RX ADMIN — FENTANYL CITRATE 50 MCG: 50 INJECTION INTRAMUSCULAR; INTRAVENOUS at 07:48

## 2019-05-23 RX ADMIN — Medication 5 MG: at 08:31

## 2019-05-23 RX ADMIN — Medication 10 MG: at 08:36

## 2019-05-23 RX ADMIN — BUPIVACAINE HYDROCHLORIDE AND EPINEPHRINE BITARTRATE 15 ML: 2.5; .005 INJECTION, SOLUTION INFILTRATION; PERINEURAL at 07:50

## 2019-05-23 RX ADMIN — KETOROLAC TROMETHAMINE 15 MG: 30 INJECTION, SOLUTION INTRAMUSCULAR at 10:00

## 2019-05-23 RX ADMIN — BUPIVACAINE 20 ML: 13.3 INJECTION, SUSPENSION, LIPOSOMAL INFILTRATION at 07:50

## 2019-05-23 RX ADMIN — BENZOCAINE, MENTHOL 2 LOZENGE: 15; 3.6 LOZENGE ORAL at 13:15

## 2019-05-23 RX ADMIN — METOPROLOL TARTRATE 50 MG: 50 TABLET ORAL at 22:19

## 2019-05-23 RX ADMIN — SIMVASTATIN 20 MG: 10 TABLET, FILM COATED ORAL at 22:20

## 2019-05-23 RX ADMIN — Medication 100 MCG: at 08:53

## 2019-05-23 RX ADMIN — Medication 100 MCG: at 09:25

## 2019-05-23 RX ADMIN — Medication 5 MG: at 08:28

## 2019-05-23 RX ADMIN — Medication 5 MG: at 09:08

## 2019-05-23 RX ADMIN — Medication 50 MCG: at 08:36

## 2019-05-23 RX ADMIN — Medication 5 MG: at 08:53

## 2019-05-23 RX ADMIN — SODIUM CHLORIDE, POTASSIUM CHLORIDE, SODIUM LACTATE AND CALCIUM CHLORIDE: 600; 310; 30; 20 INJECTION, SOLUTION INTRAVENOUS at 09:07

## 2019-05-23 RX ADMIN — Medication 100 MCG: at 09:14

## 2019-05-23 RX ADMIN — GABAPENTIN 100 MG: 100 CAPSULE ORAL at 06:48

## 2019-05-23 RX ADMIN — ACETAMINOPHEN 975 MG: 325 TABLET, FILM COATED ORAL at 06:48

## 2019-05-23 RX ADMIN — PHENYLEPHRINE HYDROCHLORIDE 0.4 MCG/KG/MIN: 10 INJECTION INTRAVENOUS at 09:25

## 2019-05-23 ASSESSMENT — ACTIVITIES OF DAILY LIVING (ADL)
TOILETING: 0-->INDEPENDENT
RETIRED_EATING: 0-->INDEPENDENT
COGNITION: 0 - NO COGNITION ISSUES REPORTED
TRANSFERRING: 0-->INDEPENDENT
RETIRED_COMMUNICATION: 0-->UNDERSTANDS/COMMUNICATES WITHOUT DIFFICULTY
BATHING: 0-->INDEPENDENT
ADLS_ACUITY_SCORE: 13
FALL_HISTORY_WITHIN_LAST_SIX_MONTHS: NO
SWALLOWING: 0-->SWALLOWS FOODS/LIQUIDS WITHOUT DIFFICULTY
ADLS_ACUITY_SCORE: 14
ADLS_ACUITY_SCORE: 13
DRESS: 0-->INDEPENDENT
AMBULATION: 0-->INDEPENDENT

## 2019-05-23 ASSESSMENT — LIFESTYLE VARIABLES: TOBACCO_USE: 0

## 2019-05-23 ASSESSMENT — MIFFLIN-ST. JEOR: SCORE: 1279

## 2019-05-23 NOTE — ANESTHESIA PROCEDURE NOTES
Peripheral Nerve Block Procedure Note    Staff:     Anesthesiologist:  Han Serrano DO  Location: Pre-op  Procedure Start/Stop TImes:      5/23/2019 7:39 AM     5/23/2019 7:44 AM    patient identified, IV checked, site marked, risks and benefits discussed, informed consent, monitors and equipment checked, pre-op evaluation, at physician/surgeon's request and post-op pain management      Correct Patient: Yes      Correct Position: Yes      Correct Site: Yes      Correct Procedure: Yes      Correct Laterality:  Yes    Site Marked:  Yes  Procedure details:     Procedure:  Popliteal    ASA:  2    Diagnosis:  Post op pain    Laterality:  Right    Position:  Supine    Sterile Prep: chloraprep, mask and sterile gloves      Local skin infiltration:  2% lidocaine    amount (mL):  2    Needle:  Short bevel and insulated    Needle gauge:  21    Needle length (inches):  4    Ultrasound: Yes      Ultrasound used to identify targeted nerve, plexus, or vascular structure and placed a needle adjacent to it      Permanent Image entered into patiient's record      Abnormal pain on injection: No      Blood Aspirated: No      Paresthesias:  No    Bleeding at site: No      Bolus via:  Needle    Infusion Method:  Single Shot    Complications:  None  Assessment/Narrative:      Informed consent obtained.  All risks and benefits of the nerve block discussed with the patient.  All questions answered and all parties agreed with the plan.   Block was placed at the surgeon's request for post operative pain control.

## 2019-05-23 NOTE — ANESTHESIA PROCEDURE NOTES
Peripheral Nerve Block Procedure Note    Staff:     Anesthesiologist:  Han Serrano DO  Location: Pre-op  Procedure Start/Stop TImes:      5/23/2019 7:45 AM     5/23/2019 7:50 AM    patient identified, IV checked, site marked, risks and benefits discussed, informed consent, monitors and equipment checked, pre-op evaluation, at physician/surgeon's request and post-op pain management      Correct Patient: Yes      Correct Position: Yes      Correct Site: Yes      Correct Procedure: Yes      Correct Laterality:  Yes    Site Marked:  Yes  Procedure details:     Procedure:  Adductor canal    ASA:  2    Diagnosis:  Post op pain    Laterality:  Right    Position:  Supine    Sterile Prep: chloraprep, mask and sterile gloves      Local skin infiltration:  2% lidocaine    amount (mL):  1    Needle:  Short bevel and insulated    Needle gauge:  21    Needle length (inches):  4    Ultrasound: Yes      Ultrasound used to identify targeted nerve, plexus, or vascular structure and placed a needle adjacent to it      Permanent Image entered into patiient's record      Abnormal pain on injection: No      Blood Aspirated: No      Paresthesias:  No    Bleeding at site: No      Bolus via:  Needle    Infusion Method:  Single Shot    Complications:  None  Assessment/Narrative:      Informed consent obtained.  All risks and benefits of the nerve block discussed with the patient.  All questions answered and all parties agreed with the plan.   Block was placed at the surgeon's request for post operative pain control.

## 2019-05-23 NOTE — CONSULTS
Consult Date:  05/23/2019      ORTHOPEDIC CONSULTATION      HISTORY OF PRESENT ILLNESS:  The patient is a very pleasant 73-year-old female admitted to station 8A status post right total ankle arthroplasty with tendo Achilles lengthening secondary to right ankle osteoarthritis.  The patient tolerated the procedure well with less than 10 mL of surgical blood loss.  An Internal Medicine consultation was ordered by Dr. Garrison to follow this patient medically postoperatively and to assess medical problems including hypertension and type 2 diabetes.  At this time, Silke admits to a mild cough she attributes to her allergy-induced postnasal drip.  Otherwise, she denies surgical pain as she obtained 2 nerve blocks prior to surgery.  Denies fever or chills.  Denies chest pain or shortness breath.  Denies abdominal pain or nausea.  Denies bowel or bladder concerns.      PAST MEDICAL HISTORY:   1.  History of right ankle osteoarthritis.   2.  Hypertension.   3.  Type 2 diabetes, noninsulin dependent.   4.  Hypothyroidism, on replacement.   5.  Hyperlipidemia.   6.  GERD.   7.  Major depressive disorder.   8.  Allergic rhinitis.   9.  Atopic dermatitis.   10.  Denies history of other chronic medical problems including cardiopulmonary disease with history of negative cardiac stress test in 2018.      PAST SURGICAL HISTORY:   1.  Lumbar surgery, 2013.   2.  Left shoulder arthroplasty, 2013.   3.  Knee replacement, 2010.   4.  Right total shoulder arthroplasty, 2015.   5.  Lumbar surgery, 2004.   6.  Tonsillectomy.   7.  Hysterectomy.      ADMISSION MEDICATIONS:   1.  Metoprolol 100 mg p.o. b.i.d.   2.  Amitriptyline 50 mg p.o. daily.   3.  Aspirin 81 mg p.o. daily.   4.  Celebrex 200 mg p.o. daily.   5.  Estradiol 1 mg p.o. daily.   6.  Hydrochlorothiazide 25 mg p.o. daily.   7.  Levothyroxine 112 mcg p.o. daily.   8.  Topical ketoconazole 2% cream apply to affected areas daily.   9.  Metformin 500 mg p.o. b.i.d.   10.  Sertraline  100 mg p.o. daily.   11.  Simvastatin 20 mg p.o. daily.   12.  Topical triamcinolone 0.1% ointment applied to affected areas b.i.d.      ALLERGIES:  NO KNOWN DRUG ALLERGIES.      SOCIAL HISTORY:  .  Retired.  Lives near [place], [place].  Occasional alcohol use.  Denies smoking cigarettes.      FAMILY HISTORY:  Reviewed and noncontributory.      REVIEW OF SYSTEMS:  Ten-point review of systems negative except as stated above in history of present illness.      PHYSICAL EXAMINATION:   GENERAL:  Very pleasant lady in no acute distress.   VITAL SIGNS:  Stable.  Temperature afebrile, pulse in the 60s, blood pressure 114/59.   HEENT:  Negative.   NECK:  Supple.  No cervical lymphadenopathy or thyromegaly.   LUNGS:  Clear.   CARDIOVASCULAR:  Regular rate and rhythm.   ABDOMEN:  Soft, nontender.   EXTREMITIES:  No left lower extremity edema.  Right lower extremity in Ace wrap, which I did not remove.   SKIN:  No acute rash on exposed areas.   NEUROLOGIC:  She is awake, alert and oriented x3.  Cranial nerves grossly intact.  No tremor noted.  Sensation intact to light touch.  Gait deferred.      LABORATORY DATA:  Preoperative potassium 4.0 and creatinine 1.1.  EKG normal.  Estimated blood loss:  Less than 10 mL.      ASSESSMENT:   1.  Status post right total ankle arthroplasty by Dr. Garrison's team.  Patient is doing very well in the acute postoperative period.   2.  Hypertension, normally stable, on twice daily metoprolol and daily hydrochlorothiazide.   3.  Type 2 diabetes, stable prior to admission, on low-dose oral metformin.   4.  Hypothyroidism, stable on replacement therapy.   5.  Hyperlipidemia.   6.  GERD stable on daily PPI therapy  7.  Mild cough, most likely due to allergy-induced postnasal drip.  Lungs clear and O2 sats high 90s on room air.  The patient declines wanting an antihistamine at this time.   8.  No known cardiopulmonary disease with history of negative cardiac stress test in 2018.      PLAN:   Continue with metoprolol with parameters, however, hold hydrochlorothiazide for the time being. Continue with metformin 500 mg p.o. b.i.d.  Accu-Cheks will be monitored initially t.i.d. before meals and at bedtime.  Routine hemoglobin level and BMP tomorrow a.m. Deep venous thrombosis prophylaxis per Dr. Garrison's protocol.  Encourage incentive spirometry every 2 hours.  No further medical intervention indicated at this time.  We will continue to follow along with you and see patient for all intercurrent medical issues.      Thank you for this consultation.         BRISA VILLEGAS PA-C             D: 2019   T: 2019   MT:       Name:     YOGI GRAF   MRN:      [id number]        Account:       [id number]   :      1945           Consult Date:  2019      Document: [id number]       cc: Anson Garrison MD

## 2019-05-23 NOTE — ANESTHESIA PREPROCEDURE EVALUATION
Anesthesia Pre-Procedure Evaluation    Patient: Silke Francis   MRN:     [id number] Gender:   female   Age:    73 year old :      1945        Preoperative Diagnosis: Primary Osteoarthritis Of Right Ankle   Procedure(s):  Right Total Ankle Arthroplasty With Tendoachilles Lengthening  LENGTHENING, TENDON, ACHILLES     Past Medical History:   Diagnosis Date     Diabetes (H)      Hypertension      Thyroid disease       History reviewed. No pertinent surgical history.       Anesthesia Evaluation     . Pt has had prior anesthetic.     No history of anesthetic complications          ROS/MED HX    ENT/Pulmonary:  - neg pulmonary ROS    (-) tobacco use   Neurologic:  - neg neurologic ROS     Cardiovascular:     (+) hypertension----. : . . . :. . Previous cardiac testing date:results:date: results:ECG reviewed date: results:NSR date: results:          METS/Exercise Tolerance:  >4 METS   Hematologic:  - neg hematologic  ROS       Musculoskeletal:  - neg musculoskeletal ROS       GI/Hepatic:  - neg GI/hepatic ROS       Renal/Genitourinary:  - ROS Renal section negative       Endo:     (+) type II DM Normal glucose range: below 130 fasting thyroid problem .      Psychiatric:  - neg psychiatric ROS       Infectious Disease:  - neg infectious disease ROS       Malignancy:      - no malignancy   Other:    - neg other ROS                     PHYSICAL EXAM:   Mental Status/Neuro:    Airway: Facies: Feasible  Mallampati: II  Mouth/Opening: Full  TM distance: > 6 cm  Neck ROM: Full   Respiratory: Auscultation: CTAB     Resp. Rate: Normal     Resp. Effort: Normal      CV: Rhythm: Regular  Rate: Age appropriate  Heart: Normal Sounds   Comments:                    Lab Results   Component Value Date    HGB 14.4 2019    POTASSIUM 3.8 2019     (H) 2019       Preop Vitals  BP Readings from Last 3 Encounters:   19 150/74    Pulse Readings from Last 3 Encounters:   19 58      Resp Readings from  "Last 3 Encounters:   05/23/19 12    SpO2 Readings from Last 3 Encounters:   05/23/19 96%      Temp Readings from Last 1 Encounters:   05/23/19 36.5  C (97.7  F) (Oral)    Ht Readings from Last 1 Encounters:   05/23/19 1.626 m (5' 4\")      Wt Readings from Last 1 Encounters:   05/23/19 79.4 kg (175 lb 0.7 oz)    Estimated body mass index is 30.05 kg/m  as calculated from the following:    Height as of this encounter: 1.626 m (5' 4\").    Weight as of this encounter: 79.4 kg (175 lb 0.7 oz).     LDA:            Assessment:   ASA SCORE: 2    NPO Status: > 6 hours since completed Solid [employer]   Documentation: H&P complete; Preop Testing complete; Consents complete   Proceeding: Proceed without further delay  Tobacco Use:  NO Active use of Tobacco/UNKNOWN Tobacco use status     Plan:   Anes. Type:  General; Regional     RA Details:  FOR POSTOP PAIN CONTROL; R; Exparel     RA-Location/Type: Nerve Block; Popliteal (and adductor)   Pre-Induction: Acetaminophen PO   Induction:  IV (Standard); Propofol   Airway: LMA   Access/Monitoring: PIV   Maintenance: Balanced   Emergence: Procedure Site   Logistics: Same Day Surgery     Postop Pain/Sedation Strategy:  Standard-Options: Opioids PRN; Block SS; Exparel     PONV Management:  Adult Risk Factors: Female, Non-Smoker, Postop Opioids  Prevention: Ondansetron; Dexamethasone     CONSENT: Direct conversation   Plan and risks discussed with: Patient   Blood Products: Consent Deferred (Minimal Blood Loss)                         Han Serrano,   "

## 2019-05-23 NOTE — BRIEF OP NOTE
[facility], [place]    Brief Operative Note    Pre-operative diagnosis: Primary Osteoarthritis Of Right Ankle  Post-operative diagnosis * No post-op diagnosis entered *  Procedure: Procedure(s):  Right Total Ankle Arthroplasty With Tendoachilles Lengthening  LENGTHENING, TENDON, ACHILLES  Surgeon: Surgeon(s) and Role:     * Anson Garrison MD - Primary     * Rafael Pillai PA-LINDA - Assisting     * Miguelito Diallo MD - Fellow - Assisting  Anesthesia: Combined General with Popliteal Block   Estimated blood loss: Less than 10 ml  Drains: None  Specimens: * No specimens in log *  Findings:   None.  Complications: None.  Implants:    Implant Name Type Inv. Item Serial No.  Lot No. LRB No. Used   IMP TIBIAL TRAY ANKLE SHAW INBONE RT SZ 3 LONG Total Joint Component/Insert IMP TIBIAL TRAY ANKLE SHAW INBONE RT SZ 3 LONG  [employer] [id number] Right 1   IMP STEM TIBIAL BASE SHAW INBONE 16MM RT&amp;LT [id number] Total Joint Component/Insert IMP STEM TIBIAL BASE SHAW INBONE 16MM RT&amp;LT [id number]  [employer] [id number] Right 1   IMP STEM TIBIAL MID SHAW INBONE 16MM RT&amp;LT [id number] Total Joint Component/Insert IMP STEM TIBIAL MID SHAW INBONE 16MM RT&amp;LT [id number]  [employer] [id number] Right 1   IMP STEM TIBIAL MID SHAW INBONE 14MM RT&amp;LT [id number] Total Joint Component/Insert IMP STEM TIBIAL MID SHAW INBONE 14MM RT&amp;LT [id number]  [employer] [id number] Right 1   IMP STEM TIBIAL TOP SHAW INBONE 14MM RT&amp;LT [id number] Total Joint Component/Insert IMP STEM TIBIAL TOP SHAW INBONE 14MM RT&amp;LT [id number]  [employer] [id number] Right 1   IMP INSERT TALAR DOME ANKLE SHAW INBONE SULCUS SZ 2 Total Joint Component/Insert IMP INSERT TALAR DOME ANKLE SHAW INBONE SULCUS SZ 2  [employer] [id number] Right 1   IMP STEM TALAR ANKLE SHAW INBONE 10MM LG [id number] Total Joint Component/Insert IMP STEM  TALAR ANKLE TONG INBONE 10MM LG [id number]  [employer] [id number] Right 1   IMP STEM TALAR ANKLE TONG INBONE 14MM LG [id number] Total Joint Component/Insert IMP STEM TALAR ANKLE TONG INBONE 14MM LG [id number]  [employer] [id number] Right 1   IMP INSERT POLY ANKLE TONG INBONE SULCUS SZ 2+ 8MM Total Joint Component/Insert IMP INSERT POLY ANKLE TONG INBONE SULCUS SZ 2+ 8MM  [employer]N [id number] Right 1        TAA - PSI Jigs  Tong Inbone  Closure 2/0 nieves, 3/0 mono, 3/0 nylon plantar wound  Posterior Slab    POST OP  WBAT standing, but crutches for walking  Elevate  Aspirin 4w

## 2019-05-23 NOTE — PLAN OF CARE
Pt arrived to unit at 1145 and was oriented to room and call light  VS: VSS   O2: >90% on RA   Output: Voiding adequately; last PVR 13   Last BM: 5/22   Activity: WBAT. 1A with FWW and gait belt.   Up for meals? Yes   Skin: Incision RLE   Pain: Minimal d/t blocks. Educated about pain management plan   CMS: Baseline neuropathy in bilateral toes. Numbness R ankle d/t blocks   Dressing: Soft cast CDI   Diet: Regular   LDA: PIV infusing   Equipment: PCD, IV pole, capnography, gait belt, FWW, BSC   Plan: TBD   Additional Info: Pt received general anesthesia + exparel + cocktail. EBL 5

## 2019-05-23 NOTE — PROGRESS NOTES
PACU to Inpatient Nursing Handoff    Patient Silke Francis is a 73 year old female who speaks English.   Procedure Procedure(s):  Right Total Ankle Arthroplasty With Tendoachilles Lengthening  LENGTHENING, TENDON, ACHILLES   Surgeon(s) Primary: Anson Garrison MD  Assisting: Rafael Pillai PA-C  Fellow - Assisting: Miguelito Diallo MD     Allergies   Allergen Reactions     Seasonal Allergies Other (See Comments)     Sinus drainage, cough and headache       Isolation  No active isolations     Past Medical History   has a past medical history of Diabetes (H), Hypertension, and Thyroid disease.    Anesthesia Combined General with Popliteal Block   Dermatome Level     Preop Meds acetaminophen (Tylenol) - time given: 0648  celecoxib (Celebrex) - time given: 0648  gabapentin (Neurontin) - time given: 0648   Nerve block Adductor canal.  Location:right. Med:bupivacaine. Time given: 0750  Femoral (popliteal).  Location:right. Med:bupivacaine. Time given: 0744   Intraop Meds ketorolac (Toradol): last given at 1000  ondansetron (Zofran): last given at 1005   Local Meds Yes   Antibiotics cefazolin (Ancef) - last given at 0822     Pain Patient Currently in Pain: yes  Comfort: comfortably manageable  Pain Control: partially effective   PACU meds  Not applicable   PCA / epidural No   Capnography     Telemetry ECG Rhythm: Sinus bradycardia   Inpatient Telemetry Monitor Ordered? No        Labs Glucose Lab Results   Component Value Date     05/23/2019       Hgb Lab Results   Component Value Date    HGB 14.4 05/23/2019       INR No results found for: INR   PACU Imaging Not applicable     Wound/Incision Incision/Surgical Site 05/23/19 Right;Anterior Ankle (Active)   Number of days: 0       Incision/Surgical Site 05/23/19 Right Ankle (Active)   Incision Assessment UTV 5/23/2019 10:53 AM   Closure Adhesive strip(s) 5/23/2019 10:43 AM   Dressing Intervention Clean, dry, intact 5/23/2019 10:53 AM   Number of  days: 0       Incision/Surgical Site 05/23/19 Right;Other (Comment) Heel (Active)   Incision Assessment UTV 5/23/2019 10:53 AM   Closure Adhesive strip(s) 5/23/2019 10:43 AM   Dressing Intervention Clean, dry, intact 5/23/2019 10:53 AM   Number of days: 0      CMS        Equipment ice pack   Other LDA       IV Access Peripheral IV 05/23/19 Left Hand (Active)   Site Assessment WDL 5/23/2019 10:36 AM   Line Status Infusing 5/23/2019 10:36 AM   Phlebitis Scale 0-->no symptoms 5/23/2019 10:36 AM   Infiltration Scale 0 5/23/2019 10:36 AM   Dressing Intervention New dressing  5/23/2019  7:12 AM   Number of days: 0      Blood Products Not applicable EBL 5 mL   Intake/Output Date 05/23/19 0700 - 05/24/19 0659   Shift 7353-2807 7966-7401 3948-1359 24 Hour Total   INTAKE   I.V. 1400   1400   Shift Total(mL/kg) 1400(17.63)   1400(17.63)   OUTPUT   Shift Total(mL/kg)       Weight (kg) 79.4 79.4 79.4 79.4      Drains / Baugh     Time of void PreOp Void Prior to Procedure: 0558 (05/23/19 0600)    PostOp Urine Occurrence: 1 (05/23/19 0558)    Diapered? No   Bladder Scan  192   PO    tolerating sips     Vitals    B/P: 114/59  T: 98.8  F (37.1  C)    Temp src: Oral  P:  Pulse: 60 (05/23/19 1100)    Heart Rate: 59 (05/23/19 1100)     R: 8  O2:  SpO2: 99 %    O2 Device: None (Room air) (05/23/19 1100)    Oxygen Delivery: 6 LPM (05/23/19 1036)         Family/support present niece   Patient belongings     Patient transported on bed   DC meds/scripts (obs/outpt) Not applicable   Inpatient Pain Meds Released? Yes       Special needs/considerations None   Tasks needing completion None       Tri Carey, CLARKE  ASCOM [id number]

## 2019-05-23 NOTE — ANESTHESIA POSTPROCEDURE EVALUATION
Anesthesia POST Procedure Evaluation    Patient: Silke Francis   MRN:     [id number] Gender:   female   Age:    73 year old :      1945        Preoperative Diagnosis: Primary Osteoarthritis Of Right Ankle   Procedure(s):  Right Total Ankle Arthroplasty With Tendoachilles Lengthening  LENGTHENING, TENDON, ACHILLES   Postop Comments: No value filed.       Anesthesia Type:  General, Regional  No value filed.    Reportable Event: NO     PAIN: Uncomplicated   Sign Out status: Comfortable, Well controlled pain     PONV: No PONV   Sign Out status:  No Nausea or Vomiting     Neuro/Psych: Uneventful perioperative course   Sign Out Status: Preoperative baseline; Age appropriate mentation     Airway/Resp.: Uneventful perioperative course   Sign Out Status: Non labored breathing, age appropriate RR; Resp. Status within EXPECTED Parameters     CV: Uneventful perioperative course   Sign Out status: Appropriate BP and perfusion indices; Appropriate HR/Rhythm     Disposition:   Sign Out in:  PACU  Disposition:  Floor  Recovery Course: Uneventful  Follow-Up: Not required           Last Anesthesia Record Vitals:  CRNA VITALS  2019 1002 - 2019 1102      2019             Pulse:  60    SpO2:  100 %    Resp Rate (observed):  10          Last PACU Vitals:  Vitals Value Taken Time   /59 2019 11:00 AM   Temp 37  C (98.6  F) 2019 10:47 AM   Pulse 60 2019 11:00 AM   Resp 22 2019 11:09 AM   SpO2 97 % 2019 11:09 AM   Temp src     NIBP     Pulse     SpO2     Resp     Temp     Ht Rate     Temp 2     Vitals shown include unvalidated device data.      Electronically Signed By: Han Serrano DO, May 23, 2019, 11:10 AM

## 2019-05-23 NOTE — ANESTHESIA CARE TRANSFER NOTE
Patient: Silke Francis    Procedure(s):  Right Total Ankle Arthroplasty With Tendoachilles Lengthening  LENGTHENING, TENDON, ACHILLES    Diagnosis: Primary Osteoarthritis Of Right Ankle  Diagnosis Additional Information: No value filed.    Anesthesia Type:   No value filed.     Note:  Airway :Face Mask  Patient transferred to:PACU  Comments: Arrived in PACU, report to RN, vitals stable, patient comfortable.  Handoff Report: Identifed the Patient, Identified the Reponsible Provider, Reviewed the pertinent medical history, Discussed the surgical course, Reviewed Intra-OP anesthesia mangement and issues during anesthesia, Set expectations for post-procedure period and Allowed opportunity for questions and acknowledgement of understanding      Vitals: (Last set prior to Anesthesia Care Transfer)    CRNA VITALS  5/23/2019 1002 - 5/23/2019 1041      5/23/2019             Pulse:  60    SpO2:  100 %    Resp Rate (observed):  10                Electronically Signed By: KEVIN Hood CRNA  May 23, 2019  10:41 AM

## 2019-05-23 NOTE — LETTER
Health Information Management Services               Recipient:    [facility]  Phone [phone]  Fax [phone]        Sender:      Nikkie Wise RN, BSN  Care Coordinator, 8A  Phone [phone]  Pager [phone]      Date: May 25, 2019  Patient Name:  Silke Francis  Routing Message:            The documents accompanying this notice contain confidential information belonging to the sender.  This information is intended only for the use of the individual or entity named above.  The authorized recipient of this information is prohibited from disclosing this information to any other party and is required to destroy the information after its stated need has been fulfilled, unless otherwise required by state law.      If you are not the intended recipient, you are hereby notified that any disclosure, copy, distribution or action taken in reliance on the contents of these documents is strictly prohibited.  If you have received this document in error, please return it by fax to [phone] with a note on the cover sheet explaining why you are returning it (e.g. not your patient, not your provider, etc.).  If you need further assistance, please call [employer] [facility] Centralized Transcription at [phone].  Documents may also be returned by mail to [facility], , [address], [place], [place] [address].

## 2019-05-24 ENCOUNTER — APPOINTMENT (OUTPATIENT)
Dept: PHYSICAL THERAPY | Facility: CLINIC | Age: 74
DRG: 469 | End: 2019-05-24
Attending: ORTHOPAEDIC SURGERY
Payer: MEDICARE

## 2019-05-24 ENCOUNTER — APPOINTMENT (OUTPATIENT)
Dept: OCCUPATIONAL THERAPY | Facility: CLINIC | Age: 74
DRG: 469 | End: 2019-05-24
Attending: ORTHOPAEDIC SURGERY
Payer: MEDICARE

## 2019-05-24 LAB
ANION GAP SERPL CALCULATED.3IONS-SCNC: 5 MMOL/L (ref 3–14)
BUN SERPL-MCNC: 21 MG/DL (ref 7–30)
CALCIUM SERPL-MCNC: 7.4 MG/DL (ref 8.5–10.1)
CHLORIDE SERPL-SCNC: 101 MMOL/L (ref 94–109)
CO2 SERPL-SCNC: 29 MMOL/L (ref 20–32)
CREAT SERPL-MCNC: 1.01 MG/DL (ref 0.52–1.04)
GFR SERPL CREATININE-BSD FRML MDRD: 55 ML/MIN/{1.73_M2}
GLUCOSE BLDC GLUCOMTR-MCNC: 116 MG/DL (ref 70–99)
GLUCOSE SERPL-MCNC: 133 MG/DL (ref 70–99)
HGB BLD-MCNC: 11.7 G/DL (ref 11.7–15.7)
POTASSIUM SERPL-SCNC: 3.7 MMOL/L (ref 3.4–5.3)
SODIUM SERPL-SCNC: 135 MMOL/L (ref 133–144)

## 2019-05-24 PROCEDURE — 25000132 ZZH RX MED GY IP 250 OP 250 PS 637: Performed by: PHYSICIAN ASSISTANT

## 2019-05-24 PROCEDURE — 25000132 ZZH RX MED GY IP 250 OP 250 PS 637: Performed by: STUDENT IN AN ORGANIZED HEALTH CARE EDUCATION/TRAINING PROGRAM

## 2019-05-24 PROCEDURE — A9270 NON-COVERED ITEM OR SERVICE: HCPCS | Performed by: STUDENT IN AN ORGANIZED HEALTH CARE EDUCATION/TRAINING PROGRAM

## 2019-05-24 PROCEDURE — 85018 HEMOGLOBIN: CPT | Performed by: STUDENT IN AN ORGANIZED HEALTH CARE EDUCATION/TRAINING PROGRAM

## 2019-05-24 PROCEDURE — 97535 SELF CARE MNGMENT TRAINING: CPT | Mod: GO | Performed by: OCCUPATIONAL THERAPIST

## 2019-05-24 PROCEDURE — 00000146 ZZHCL STATISTIC GLUCOSE BY METER IP

## 2019-05-24 PROCEDURE — L4360 PNEUMAT WALKING BOOT PRE CST: HCPCS

## 2019-05-24 PROCEDURE — 97165 OT EVAL LOW COMPLEX 30 MIN: CPT | Mod: GO | Performed by: OCCUPATIONAL THERAPIST

## 2019-05-24 PROCEDURE — 97530 THERAPEUTIC ACTIVITIES: CPT | Mod: GP | Performed by: PHYSICAL THERAPIST

## 2019-05-24 PROCEDURE — 97530 THERAPEUTIC ACTIVITIES: CPT | Mod: GO | Performed by: OCCUPATIONAL THERAPIST

## 2019-05-24 PROCEDURE — 99207 ZZC CDG-MDM COMPONENT: MEETS MODERATE - UP CODED: CPT | Performed by: INTERNAL MEDICINE

## 2019-05-24 PROCEDURE — 99232 SBSQ HOSP IP/OBS MODERATE 35: CPT | Performed by: INTERNAL MEDICINE

## 2019-05-24 PROCEDURE — 36415 COLL VENOUS BLD VENIPUNCTURE: CPT | Performed by: STUDENT IN AN ORGANIZED HEALTH CARE EDUCATION/TRAINING PROGRAM

## 2019-05-24 PROCEDURE — 80048 BASIC METABOLIC PNL TOTAL CA: CPT | Performed by: STUDENT IN AN ORGANIZED HEALTH CARE EDUCATION/TRAINING PROGRAM

## 2019-05-24 PROCEDURE — 25000128 H RX IP 250 OP 636: Performed by: STUDENT IN AN ORGANIZED HEALTH CARE EDUCATION/TRAINING PROGRAM

## 2019-05-24 PROCEDURE — 97161 PT EVAL LOW COMPLEX 20 MIN: CPT | Mod: GP | Performed by: PHYSICAL THERAPIST

## 2019-05-24 PROCEDURE — 12000001 ZZH R&B MED SURG/OB UMMC

## 2019-05-24 RX ADMIN — ESTRADIOL 1 MG: 1 TABLET ORAL at 08:18

## 2019-05-24 RX ADMIN — SENNOSIDES AND DOCUSATE SODIUM 1 TABLET: 8.6; 5 TABLET ORAL at 08:19

## 2019-05-24 RX ADMIN — ACETAMINOPHEN 975 MG: 325 TABLET, FILM COATED ORAL at 18:16

## 2019-05-24 RX ADMIN — METOPROLOL TARTRATE 50 MG: 50 TABLET ORAL at 19:37

## 2019-05-24 RX ADMIN — OXYCODONE HYDROCHLORIDE 5 MG: 5 TABLET ORAL at 22:38

## 2019-05-24 RX ADMIN — SERTRALINE HYDROCHLORIDE 100 MG: 100 TABLET ORAL at 08:19

## 2019-05-24 RX ADMIN — METFORMIN HYDROCHLORIDE 500 MG: 500 TABLET ORAL at 08:19

## 2019-05-24 RX ADMIN — SIMVASTATIN 20 MG: 10 TABLET, FILM COATED ORAL at 22:38

## 2019-05-24 RX ADMIN — OXYCODONE HYDROCHLORIDE 5 MG: 5 TABLET ORAL at 16:16

## 2019-05-24 RX ADMIN — SENNOSIDES AND DOCUSATE SODIUM 2 TABLET: 8.6; 5 TABLET ORAL at 19:37

## 2019-05-24 RX ADMIN — ACETAMINOPHEN 975 MG: 325 TABLET, FILM COATED ORAL at 08:21

## 2019-05-24 RX ADMIN — ACETAMINOPHEN 975 MG: 325 TABLET, FILM COATED ORAL at 01:00

## 2019-05-24 RX ADMIN — CEFAZOLIN 1 G: 1 INJECTION, POWDER, FOR SOLUTION INTRAMUSCULAR; INTRAVENOUS at 01:01

## 2019-05-24 RX ADMIN — AMITRIPTYLINE HYDROCHLORIDE 25 MG: 25 TABLET, FILM COATED ORAL at 22:37

## 2019-05-24 RX ADMIN — OXYCODONE HYDROCHLORIDE 5 MG: 5 TABLET ORAL at 01:26

## 2019-05-24 RX ADMIN — ASPIRIN 325 MG: 325 TABLET, COATED ORAL at 08:18

## 2019-05-24 RX ADMIN — LANSOPRAZOLE 30 MG: 30 CAPSULE, DELAYED RELEASE ORAL at 08:18

## 2019-05-24 RX ADMIN — METFORMIN HYDROCHLORIDE 500 MG: 500 TABLET ORAL at 18:16

## 2019-05-24 RX ADMIN — RANITIDINE 150 MG: 150 TABLET ORAL at 22:38

## 2019-05-24 RX ADMIN — OXYCODONE HYDROCHLORIDE 5 MG: 5 TABLET ORAL at 08:18

## 2019-05-24 ASSESSMENT — ACTIVITIES OF DAILY LIVING (ADL)
ADLS_ACUITY_SCORE: 15
ADLS_ACUITY_SCORE: 15
ADLS_ACUITY_SCORE: 13
ADLS_ACUITY_SCORE: 15
ADLS_ACUITY_SCORE: 13
ADLS_ACUITY_SCORE: 13

## 2019-05-24 NOTE — PROGRESS NOTES
05/24/19 1029   Quick Adds   Type of Visit Initial Occupational Therapy Evaluation   Living Environment   Lives With alone   Living Arrangements house   Number of Stairs, Main Entrance 2   Transportation Anticipated family or friend will provide   Living Environment Comment Roll-in shower, grab bars, shower chair in bathroom, toilet safety fram   Self-Care   Usual Activity Tolerance fair   Current Activity Tolerance fair   Equipment Currently Used at Home walker, rolling   Activity/Exercise/Self-Care Comment Patient independent in self-cares/mobility without AE for mobility; reports does not go out into community due to arthritis   Functional Level   Ambulation 0-->independent   Transferring 0-->independent   Toileting 1-->assistive equipment   Bathing 1-->assistive equipment   Dressing 0-->independent   Eating 0-->independent   Communication 0-->understands/communicates without difficulty   Swallowing 0-->swallows foods/liquids without difficulty   Cognition 0 - no cognition issues reported   Fall history within last six months no   Prior Functional Level Comment Patient independent in self-cares/mobility without AE for mobility; reports does not go out into community due to arthritis   General Information   Onset of Illness/Injury or Date of Surgery - Date 05/23/19   Referring Physician Dr. Garrison   Patient/Family Goals Statement return home to baseline   Additional Occupational Profile Info/Pertinent History of Current Problem POD #1 s/p Status post right total ankle arthroplasty    Precautions/Limitations other (see comments)   Weight-Bearing Status - RLE nonweight-bearing  (WBAT in Cam boot when standing only)   General Observations On RA, alert   Cognitive Status Examination   Cognitive Comment No cognitive concerns   Pain Assessment   Patient Currently in Pain Yes, see Vital Sign flowsheet   Range of Motion (ROM)   ROM Comment Limited ROM of hands, reports hx of bilateral shoulders, spine surgery, knee  surgeries due to arthritis   Mobility   Bed Mobility Bed mobility skill: Supine to sit   Bed Mobility Skill: Supine to Sit   Level of [place]: Supine/Sit stand-by assist   Transfer Skill: Bed to Chair/Chair to Bed   Level of [place]: Bed to Chair contact guard   Weight-Bearing Restrictions nonweight-bearing   Assistive Device - Transfer Skill Bed to Chair Chair to Bed Rehab Eval other (see comments)  (knee scooter)   Transfer Skill: Sit to Stand   Level of [place]: Sit/Stand contact guard   Transfer Skill: Sit to Stand nonweight-bearing   Assistive Device for Transfer: Sit/Stand other (see comments)  (knee scooter)   Transfer Skill: Toilet Transfer   Level of [place]: Toilet contact guard   Weight-Bearing Restrictions: Toilet nonweight-bearing   Assistive Device other (see comments)  (knee scooter)   Upper Body Dressing   Level of [place]: Dress Upper Body independent   Lower Body Dressing   Level of [place]: Dress Lower Body stand-by assist   Toileting   Level of [place]: Toilet stand-by assist   Grooming   Level of [place]: Grooming independent   Eating/Self Feeding   Level of [place]: Eating independent   Activities of Daily Living Analysis   Impairments Contributing to Impaired Activities of Daily Living post surgical precautions;strength decreased   General Therapy Interventions   Planned Therapy Interventions ADL retraining   Clinical Impression   Criteria for Skilled Therapeutic Interventions Met yes, treatment indicated   OT Diagnosis impaired self-cares/mobility   Influenced by the following impairments limited weightbearing; pain; weakness   Assessment of Occupational Performance 1-3 Performance Deficits   Identified Performance Deficits dressing, bathing, toileting   Clinical Decision Making (Complexity) Low complexity   Therapy Frequency daily   Predicted Duration of Therapy Intervention (days/wks) 2-3 days   Anticipated Discharge Disposition Home with  "Assist;Home with Home Therapy   Risks and Benefits of Treatment have been explained. Yes   Patient, Family & other staff in agreement with plan of care Yes   [facility] TM \"6 Clicks\"   2016, Trustees of [facility], under license to [employer].  All rights reserved.   6 Clicks Short Forms Daily Activity Inpatient Short Form   [facility]  \"6 Clicks\" Daily Activity Inpatient Short Form   1. Putting on and taking off regular lower body clothing? 4 - None   2. Bathing (including washing, rinsing, drying)? 3 - A Little   3. Toileting, which includes using toilet, bedpan or urinal? 3 - A Little   4. Putting on and taking off regular upper body clothing? 4 - None   5. Taking care of personal grooming such as brushing teeth? 4 - None   6. Eating meals? 4 - None   Daily Activity Raw Score (Score out of 24.Lower scores equate to lower levels of function) 22   Total Evaluation Time   Total Evaluation Time (Minutes) 8     "

## 2019-05-24 NOTE — PROGRESS NOTES
"Orthopedic Surgery Progress Note    Subjective / Interval Events:   Patient resting in bed; pain controlled.    Objective:   Vital Signs Temp (24hrs), Av.8  F (36.6  C), Min:95.9  F (35.5  C), Max:99.9  F (37.7  C)    /49 (BP Location: Right arm)   Pulse 70   Temp 98  F (36.7  C) (Oral)   Resp 11   Ht 1.626 m (5' 4\")   Wt 79.4 kg (175 lb 0.7 oz)   SpO2 95%   BMI 30.05 kg/m      Physical Examination   General: no acute distress  Resp: Nonlabored breathing  RLE: short leg splint in place    Extremity elevated    Wiggling toes; SILT toes    Toes warm and perfused    Labs (Last 24 hour):   Lab Results   Component Value Date    HGB 11.7 2019     Lab Results   Component Value Date    BUN 21 2019     2019    CO2 29 2019    ANIONGAP 5 2019     All cultures:  No results for input(s): CULT in the last 168 hours.  Assessment / Plan:   This is a 73 year old female s/p right TAA on 2019 by Dr. Garrison    Pain control; continue to wean medications as tolerated  CV: Monitor HR/BP  Resp: Encourage IS  Heme/ID: Monitor Hgb; 11.7 this morning  GI/: diet as tolerated  PT/OT  Will convert to CAM today    Remove for ankle ROM  Post-op antibiotics  DVT ppx: aspirin    Plan for discharge home tomorrow    Arsalan Gonzales MD  Orthopaedic Surgery, Adult Reconstruction Fellow  Pager [phone]  "

## 2019-05-24 NOTE — PROGRESS NOTES
S.  Patient was seen today at [facility] for an evaluation for a cam walker for their Right foot/ankle as ordered by Dr. Ibrahim.  O/G. help stabilize foot and ankle.  A.  Patient was fit with a tall  pneumatic Cam Walker for the right leg.  Cam walker was assembled by removing the soft liner from the foot plate and uprights, the patients foot and leg was positioned into the soft liner with the heel firmly sealed.  The liner was closed tightly and secured with the Velcro closure. The heel and foot were positioned in the rocker bottom foot plate and the uprights were contoured to fall at mid-line of the lower leg and secured to the Velcro.  The foot plate Velcro straps were secured, proximal straps first, then the distal strap.  The lower leg straps were attached starting distal and working proximal. The ankle joints were set at 90 degrees of dorsi/plantar flexion. Donning and doffing of the Cam Walker was explained.  P.   Patient was advised to contact this office with any problems or questions.  CONSTANTINE Perez.

## 2019-05-24 NOTE — PROGRESS NOTES
"[facility]    Medicine Progress Note - Hospitalist Service       Date of Admission:  5/23/2019  Assessment & Plan   Status post right total ankle arthroplasty by Dr. Garrison's team.   #  Hypertension, normally stable, on twice daily metoprolol 50 mg bidand daily hydrochlorothiazide. Hold parameter. Hold hctz  # Type 2 diabetes, stable prior to admission, on low-dose oral metformin. 500 mg bid  #  Hypothyroidism, stable on replacement therapy.   #  Hyperlipidemia. ; on zocor 20 mg   # GERD stable on daily PPI therapy  #Depression ; on sertraline 100 mg daily   # No known cardiopulmonary disease with history of negative cardiac stress test in 2018.       Diet: Advance Diet as Tolerated: Regular Diet Adult  Diet    DVT Prophylaxis: asa 325  Baugh Catheter: not present  Code Status: Full Code           The patient's care was discussed with the Care Coordinator/.    Saira Perez MD  Hospitalist Service  [facility], [place]    ______________________________________________________________________    Interval History   Feels well   No cp or sob   No fever ro chills  Pain managed   No abdominal pain         Physical Exam   Vital Signs: Temp: 98  F (36.7  C) Temp src: Oral BP: 111/49 Pulse: 70 Heart Rate: 64 Resp: 11 SpO2: 95 % O2 Device: None (Room air) Oxygen Delivery: 6 LPM  Weight: 175 lbs .72 oz  On Exam ;   Alert and oriented . No acute distress  Vital signs:  Temp: 98  F (36.7  C) Temp src: Oral BP: 111/49 Pulse: 70 Heart Rate: 64 Resp: 11 SpO2: 95 % O2 Device: None (Room air) Oxygen Delivery: 6 LPM Height: 162.6 cm (5' 4\") Weight: 79.4 kg (175 lb 0.7 oz)  Estimated body mass index is 30.05 kg/m  as calculated from the following:    Height as of this encounter: 1.626 m (5' 4\").    Weight as of this encounter: 79.4 kg (175 lb 0.7 oz).  Neck ; supple , no JVD  Chest ; equal BS bilaterally , no rales or rhonchi   CVS; RRR, no murmur /rubs " or gallops  GI ; soft abdomen, non tender, BS positive

## 2019-05-24 NOTE — PLAN OF CARE
Discharge Planner OT   Patient plan for discharge: Home; reports niece can stay with patient for 24 hours.  Patient agreeable to home therapy.  Patient independent at baseline with ADLs/mobility in home; reports sedentary lifestyle due to overall arthritis pain.  Has roll-in shower, shower chair, toilet safety frame and large bathroom in which she is able to maneuver knee scooter.  Current status: Patient alert and oriented, pleasant and very motivated to discharge home rather than TCU.  Patient with limited sensation to R LE; reports no pain.  Patient completed bed mobility with SBA, CGA for transfers from bed to scooter and scooter to commode with NWB with mobility.  Patient able to don/doff CAM boot with independence.  Barriers to return to prior living situation: NWB with transfers; safety with transfers  Recommendations for discharge: Home vs TCU- defer to PT with stairs/mobility, from an OT standpoint if able to complete transfers safely with NWB could discharge home with home therapy for home safety evaluation and education with ADLs in own environment as has a unique set-up  Rationale for recommendations: Continued daily OT for maximum independence in ADLs/mobility       Entered by: Brisa Matias 05/24/2019 12:57 PM

## 2019-05-24 NOTE — PLAN OF CARE
"  VS: Stable   O2 Sats 95% on room air  Lung Sounds CTA, denies chest pain/SOB  IS used independently   Output: Voids spontaneously without difficulty   Bowels/BM BS + all quadrants, + flatus  Last BM 5/22, declined suppository, did have prune juice this morning.  Fluids encouraged   Activity Assist of 1.  Needs reminders for NWB RLE   Skin: Intact except for surgical incision RLE   Pain: Oxycodone 5 mg prior to therapy this morning.  Pt reporting negligible pain, states \"block is working beautifully\".   Neuro/CMS: Intact with baseline numbness BLE   Dressing: Soft cast removed, CAM boot in place  Right ankle dressing CDI   Diet: Regular, tolerating well   LDA: PIV left hand, WDL, saline locked   Equipment: CAM boot  Knee walker   Plan: Plan to discharge home 5/25   Additional Info:       "

## 2019-05-24 NOTE — PLAN OF CARE
VS: Stable.   O2: RA, capno on.   Output: Voiding adequately using commode.   Last BM: 5/22/19.   Activity: Ax1 with walker, WBAT, WAR.   Skin: Incision.   Pain: Discomfort in right ankle and back managed with oxycodone, scheduled tylenol, and ice.   CMS: Baseline numbness and tingling in toes.   Dressing: Soft cast CDI.   Diet: Regular.   LDA: PIV left hand infusing NS at 75 ml/hr.   Equipment: IV pole, capno, walker, commode, foam wedge, IS, PCD LLE, compression stocking LLE, pillows, call light within reach.   Plan: Continue to monitor.   Additional Info: Pt needs an order for her celebrex that she takes twice daily.

## 2019-05-24 NOTE — PROGRESS NOTES
05/24/19 0834   Quick Adds   Type of Visit Initial PT Evaluation       Present no   Language English   Living Environment   Lives With alone  (niece who lives 20 minutes away, will stay for first 24hrs)   Living Arrangements house   Home Accessibility stairs to enter home   Number of Stairs, Main Entrance 2  (platform step)   Stair Railings, Main Entrance railing on left side (ascending)   Transportation Anticipated family or friend will provide   Living Environment Comment Pt reported having 2 steps to get into the home from the garage, then everything is on one level.    Self-Care   Usual Activity Tolerance moderate   Current Activity Tolerance fair   Regular Exercise No   Equipment Currently Used at Home walker, rolling;other (see comments)  (4-ww for the past week)   Activity/Exercise/Self-Care Comment Pt was independent with all ADLs at baseline.  Pt reported having assist with cleaning, but does her own cooking.    Functional Level Prior   Ambulation 0-->independent   Transferring 0-->independent   Toileting 0-->independent   Bathing 0-->independent   Communication 0-->understands/communicates without difficulty   Swallowing 0-->swallows foods/liquids without difficulty   Cognition 0 - no cognition issues reported   Fall history within last six months no   Which of the above functional risks had a recent onset or change? ambulation;transferring   Prior Functional Level Comment Pt reported being independent with all functional mobility at baseline. Has medical guardian alert at home.    General Information   Onset of Illness/Injury or Date of Surgery - Date 05/23/19   Referring Physician Anson Garrison MD   Patient/Family Goals Statement Pt would like to do things without help and be able to do things without ankle pain.    Pertinent History of Current Problem (include personal factors and/or comorbidities that impact the POC) Pt is s/p right TAA, see chart for PMH.     Precautions/Limitations fall precautions   Weight-Bearing Status - LUE full weight-bearing   Weight-Bearing Status - RUE full weight-bearing   Weight-Bearing Status - LLE full weight-bearing   Weight-Bearing Status - RLE nonweight-bearing  (when ambulating, WBAT in standing)   Heart Disease Risk Factors High blood pressure;Diabetes   General Observations Pt sitting at EOB at start of PT session eating breakfast.    General Info Comments Pt reported having a 4-ww   Cognitive Status Examination   Orientation other (see comments)  (Not tested)   Level of Consciousness alert   Follows Commands and Answers Questions 100% of the time;able to follow multistep instructions   Personal Safety and Judgment intact   Memory intact   Pain Assessment   Patient Currently in Pain Yes, see Vital Sign flowsheet   Integumentary/Edema   Integumentary/Edema other (describe)   Integumentary/Edema Comments Incision not observed due to post-op dressing in place.  Pt has splint in place on right LE.    Posture    Posture Forward head position;Protracted shoulders   Range of Motion (ROM)   ROM Comment Pt demonstrated functional hip, knee, and left ankle ROM.  Right ankle ROM not tested due to splint in place.    Strength   Strength Comments LE strength not formally tested.  Pt demonstrated decreased UE and LE strength during transfers.     Bed Mobility   Bed Mobility Comments Not tested.    Transfer Skills   Transfer Comments Sit to/from standing from EOB with FWW and CGA x 1.  Unable to maintain NWB during all transfers.  Bed to/from chair transfer with FWW and CGA x 1, unable to maintain NWB during transfer.     Gait   Gait Comments Pt was able to take 2-3 steps from bed to/from commode and forwards/backwards.  Pt unable to maintain NWB during gait.      Balance   Balance Comments Pt demonstrated good sitting balance at EOB and fair standing balance with FWW.     Sensory Examination   Sensory Perception Comments Not tested   General  "Therapy Interventions   Planned Therapy Interventions bed mobility training;gait training;transfer training   Intervention Comments Bed mobility, transfers, and gait initiated.    Clinical Impression   Criteria for Skilled Therapeutic Intervention yes, treatment indicated   PT Diagnosis Decreased strength, decreased ROM, and impaired mobility.    Influenced by the following impairments Weakness, arthritis, and deconditioned.    Functional limitations due to impairments Impaired bed mobility, transfers, and gait.    Clinical Presentation Stable/Uncomplicated   Clinical Presentation Rationale Pt is a 74 y/o female s/p right TAA.  Pt with limited activity tolerance at baseline and arthritis in multiple joints.  Pt was using 4-ww prior to surgery, but independent with ADLs.     Clinical Decision Making (Complexity) Low complexity   Therapy Frequency` 2 times/day   Predicted Duration of Therapy Intervention (days/wks) 4 days   Anticipated Equipment Needs at Discharge other (see comments)  (No equipment needs)   Anticipated Discharge Disposition Home with Home Therapy;Transitional Care Facility   Risk & Benefits of therapy have been explained Yes   Patient, Family & other staff in agreement with plan of care Yes   [facility] [employer] \"6 Clicks\"   2016, Trustees of [facility], under license to [employer].  All rights reserved.   6 Clicks Short Forms Basic Mobility Inpatient Short Form   [facility]  \"6 Clicks\" V.2 Basic Mobility Inpatient Short Form   1. Turning from your back to your side while in a flat bed without using bedrails? 4 - None   2. Moving from lying on your back to sitting on the side of a flat bed without using bedrails? 4 - None   3. Moving to and from a bed to a chair (including a wheelchair)? 3 - A Little   4. Standing up from a chair using your arms (e.g., wheelchair, or bedside chair)? 3 - A Little   5. To walk in hospital room? 3 - A Little   6. Climbing 3-5 steps with a " railing? 2 - A Lot   Basic Mobility Raw Score (Score out of 24.Lower scores equate to lower levels of function) 19   Total Evaluation Time   Total Evaluation Time (Minutes) 10

## 2019-05-24 NOTE — PLAN OF CARE
Discharge Planner PT   Patient plan for discharge: Home, agreeable to home care  Current status: PT evaluation completed.  Sit to standing from EOB with FWW and CGA x 1, unable to maintain NWB with transfers.  Bed to commode and chair transfer with FWW and CGA x 1, unable to maintain NWB during transfers.    Barriers to return to prior living situation: Level of assist, safety, arthritis in multiple joints limits ability to maintain NWB, lives alone, and stairs.   Recommendations for discharge: Dual plan, home with home PT vs TCU  Rationale for recommendations: Pt would benefit from further skilled PT for LE strengthening and increase independence with all functional mobility/gait.         Entered by: Chela Mckeon 05/24/2019 9:31 AM

## 2019-05-25 ENCOUNTER — APPOINTMENT (OUTPATIENT)
Dept: OCCUPATIONAL THERAPY | Facility: CLINIC | Age: 74
DRG: 469 | End: 2019-05-25
Attending: ORTHOPAEDIC SURGERY
Payer: MEDICARE

## 2019-05-25 ENCOUNTER — APPOINTMENT (OUTPATIENT)
Dept: PHYSICAL THERAPY | Facility: CLINIC | Age: 74
DRG: 469 | End: 2019-05-25
Attending: ORTHOPAEDIC SURGERY
Payer: MEDICARE

## 2019-05-25 VITALS
WEIGHT: 175.04 LBS | SYSTOLIC BLOOD PRESSURE: 116 MMHG | OXYGEN SATURATION: 95 % | BODY MASS INDEX: 29.88 KG/M2 | TEMPERATURE: 96.2 F | RESPIRATION RATE: 16 BRPM | HEIGHT: 64 IN | DIASTOLIC BLOOD PRESSURE: 41 MMHG | HEART RATE: 55 BPM

## 2019-05-25 LAB
GLUCOSE BLDC GLUCOMTR-MCNC: 99 MG/DL (ref 70–99)
HGB BLD-MCNC: 11.1 G/DL (ref 11.7–15.7)

## 2019-05-25 PROCEDURE — 99207 ZZC CDG-MDM COMPONENT: MEETS MODERATE - UP CODED: CPT | Performed by: INTERNAL MEDICINE

## 2019-05-25 PROCEDURE — 85018 HEMOGLOBIN: CPT | Performed by: STUDENT IN AN ORGANIZED HEALTH CARE EDUCATION/TRAINING PROGRAM

## 2019-05-25 PROCEDURE — 25000132 ZZH RX MED GY IP 250 OP 250 PS 637: Performed by: STUDENT IN AN ORGANIZED HEALTH CARE EDUCATION/TRAINING PROGRAM

## 2019-05-25 PROCEDURE — A9270 NON-COVERED ITEM OR SERVICE: HCPCS | Performed by: STUDENT IN AN ORGANIZED HEALTH CARE EDUCATION/TRAINING PROGRAM

## 2019-05-25 PROCEDURE — 97530 THERAPEUTIC ACTIVITIES: CPT | Mod: GO | Performed by: OCCUPATIONAL THERAPIST

## 2019-05-25 PROCEDURE — 00000146 ZZHCL STATISTIC GLUCOSE BY METER IP

## 2019-05-25 PROCEDURE — 99232 SBSQ HOSP IP/OBS MODERATE 35: CPT | Performed by: INTERNAL MEDICINE

## 2019-05-25 PROCEDURE — 97530 THERAPEUTIC ACTIVITIES: CPT | Mod: GP

## 2019-05-25 PROCEDURE — 36415 COLL VENOUS BLD VENIPUNCTURE: CPT | Performed by: STUDENT IN AN ORGANIZED HEALTH CARE EDUCATION/TRAINING PROGRAM

## 2019-05-25 PROCEDURE — 97535 SELF CARE MNGMENT TRAINING: CPT | Mod: GO | Performed by: OCCUPATIONAL THERAPIST

## 2019-05-25 RX ADMIN — ACETAMINOPHEN 975 MG: 325 TABLET, FILM COATED ORAL at 08:06

## 2019-05-25 RX ADMIN — OXYCODONE HYDROCHLORIDE 5 MG: 5 TABLET ORAL at 16:09

## 2019-05-25 RX ADMIN — SERTRALINE HYDROCHLORIDE 100 MG: 100 TABLET ORAL at 08:06

## 2019-05-25 RX ADMIN — ACETAMINOPHEN 975 MG: 325 TABLET, FILM COATED ORAL at 00:06

## 2019-05-25 RX ADMIN — METFORMIN HYDROCHLORIDE 500 MG: 500 TABLET ORAL at 08:06

## 2019-05-25 RX ADMIN — ESTRADIOL 1 MG: 1 TABLET ORAL at 08:06

## 2019-05-25 RX ADMIN — OXYCODONE HYDROCHLORIDE 5 MG: 5 TABLET ORAL at 05:54

## 2019-05-25 RX ADMIN — OXYCODONE HYDROCHLORIDE 5 MG: 5 TABLET ORAL at 02:05

## 2019-05-25 RX ADMIN — ASPIRIN 325 MG: 325 TABLET, COATED ORAL at 08:06

## 2019-05-25 RX ADMIN — SENNOSIDES AND DOCUSATE SODIUM 2 TABLET: 8.6; 5 TABLET ORAL at 08:06

## 2019-05-25 RX ADMIN — LANSOPRAZOLE 30 MG: 30 CAPSULE, DELAYED RELEASE ORAL at 08:05

## 2019-05-25 ASSESSMENT — ACTIVITIES OF DAILY LIVING (ADL)
ADLS_ACUITY_SCORE: 15

## 2019-05-25 NOTE — DISCHARGE SUMMARY
ORTHOPAEDIC DISCHARGE SUMMARY     Date of Admission: 5/23/2019  Date of Discharge: 5/25/2019  Disposition: Home  Staff Physician: Anson Garrison MD  Primary Care Provider: Anson Christopher    DISCHARGE DIAGNOSIS:  Primary Osteoarthritis Of Right Ankle    PROCEDURES: Procedure(s):  Right Total Ankle Arthroplasty With Tendoachilles Lengthening on 5/23/2019    BRIEF HISTORY:  This is a 73 year old female who presented with right ankle arthritis refractory to conservative management. She elected to proceed with total ankle arthroplasty after discussing the risks, benefits, and alternatives.    HOSPITAL COURSE:    Surgery was uncomplicated. Silke Francis has done well post-operatively. Medicine was consulted post operatively to aid in management of medical comorbidities. See final recommendations below. The patient received routine nursing cares and is medically stable. Vital signs are stable. The patient is tolerating a regular diet without GI distress/nausea or vomiting. Voiding spontaneously. All PT/OT goals have been met for safe mobility. Pain is now controlled on oral medications which will be available on discharge. Stool softeners have been used while taking pain medications to help prevent constipation. Silke Francis is deemed medically safe to discharge.     Antibiotics:  Cefazolin given periop and 24 hours postop.  DVT prophylaxis:  Ambulatory with SCD's  PT Progress:  Has met PT/OT goals for safe mobility.   Pain Meds:  Weaned off all IV pain meds by discharge.  Inpatient Events: No significant events or complications.     Discharge orders and instructions as below.    FOLLOWUP:    Future Appointments   Date Time Provider Department Center   6/6/2019 11:15 AM Aleena Ibrahim APRN [facility]   7/15/2019  2:15 PM Anson Garrison MD [facility]       Appointments are at the [facility] ([address] [place] [address], [id number]). Call [phone] if you haven't heard  regarding these appointments within 7 days of discharge.    PLANNED DISCHARGE ORDERS:     DVT Prophylaxis: Ambulatory      Current Discharge Medication List      START taking these medications    Details   acetaminophen (TYLENOL) 325 MG tablet Take 2 tablets (650 mg) by mouth every 4 hours as needed for other (multimodal surgical pain management along with NSAIDS and opioid medication as indicated based on pain control and physical function.)  Qty: 60 tablet, Refills: 0    Associated Diagnoses: Status post surgery      oxyCODONE (ROXICODONE) 5 MG tablet Take 1-2 tablets (5-10 mg) by mouth every 3 hours as needed  Qty: 30 tablet, Refills: 0    Associated Diagnoses: Status post surgery      senna-docusate (SENOKOT-S/PERICOLACE) 8.6-50 MG tablet Take 1 tablet by mouth 2 times daily  Qty: 20 tablet, Refills: 0    Associated Diagnoses: Status post surgery         CONTINUE these medications which have CHANGED    Details   aspirin (ASA) 325 MG EC tablet Take 1 tablet (325 mg) by mouth daily  Qty: 28 tablet, Refills: 0    Associated Diagnoses: Status post surgery         CONTINUE these medications which have NOT CHANGED    Details   amitriptyline (ELAVIL) 25 MG tablet Take 25 mg by mouth At Bedtime Take 2 tablets at bedtime (2200)      ESTRADIOL PO Take 1 mg by mouth every morning Takes @ 0900      levothyroxine (SYNTHROID/LEVOTHROID) 112 MCG tablet Take 112 mcg by mouth every morning Takes @ 0900      METOPROLOL TARTRATE PO Take 100 mg by mouth 2 times daily Takes @ 0900 & 2200      ranitidine (ZANTAC) 150 MG tablet Take 150 mg by mouth as needed for heartburn      SERTRALINE HCL PO Take 100 mg by mouth every morning @ 0900      SIMVASTATIN PO Take 20 mg by mouth At Bedtime Takes @ 2200      hydrochlorothiazide (HYDRODIURIL) 12.5 MG tablet Take 25 mg by mouth 2 times daily      LANSOPRAZOLE PO Take 30 mg by mouth      metFORMIN (GLUCOPHAGE) 500 MG tablet Take 500 mg by mouth 2 times daily Takes at 0900 & 2200      order  for DME [employer] Walker. Patient can use for 4 months  Qty: 1 Units, Refills: 0    Associated Diagnoses: Primary osteoarthritis of right ankle         STOP taking these medications       Celecoxib (CELEBREX PO) Comments:   Reason for Stopping:                 Discharge Procedure Orders   Home care nursing referral   Referral Priority: Routine Referral Type: Home Health Therapies & Aides   Number of Visits Requested: 1     Home Care PT Referral for Hospital Discharge   Referral Priority: Routine Referral Type: Home Health Therapies & Aides   Number of Visits Requested: 1     Home Care OT Referral for Hospital Discharge   Referral Priority: Routine   Number of Visits Requested: 1     Reason for your hospital stay   Order Comments: Admitted for R TAA     Discharge Instructions   Order Comments: You have been prescribed aspirin for VTE prophylaxis. Please take daily for four weeks post operatively.     [facility] Follow-up and recommended labs and tests   Order Comments: You will follow up with Dr [facility] at 2 weeks (Aleena Ibrahim) and 6 weeks (Dr Garrison)    [facility] ([address] [place] [address]). Call [phone] to schedule a follow-up appointment at this location with your provider.     MD face to face encounter   Order Comments: Documentation of Face to Face and Certification for Home Health Services    I certify that patient: Silke Francis is under my care and that I, or a nurse practitioner or physician's assistant working with me, had a face-to-face encounter that meets the physician face-to-face encounter requirements with this patient on: May 25, 2019.    This encounter with the patient was in whole, or in part, for the following medical condition, which is the primary reason for home health care: Right ankle arthroplasty.  .    I certify that, based on my findings, the following services are medically necessary home health services: Nursing and Physical  Therapy.    My clinical findings support the need for the above services because: Nurse is needed: For complex aftercare of surgical procedures because the patient needs instruction and cannot perform care on their own due to: ankle arthroplasty. Occupational Therapy Services are needed to assess and treat cognitive ability and address ADL safety and Physical Therapy Services are needed to assess and treat the following functional impairments: balance, mobility and strengthening.    Further, I certify that my clinical findings support that this patient is homebound (i.e. absences from home require considerable and taxing effort and are for medical reasons or [facility] services or infrequently or of short duration when for other reasons) because: Requires assistance of another person or specialized equipment to access medical services because patient: Is unable to operate assistive equipment on their own., Range of motion limitations prevents ability to exit home safely. and Requires supervision of another for safe transfer...    Based on the above findings. I certify that this patient is confined to the home and needs intermittent skilled nursing care, physical therapy and/or speech therapy.  The patient is under my care, and I have initiated the establishment of the plan of care.  This patient will be followed by a physician who will periodically review the plan of care.  Physician/Provider to provide follow up care: Anson Christopher    Attending hospital physician (the Medicare certified PEC provider): Anson Garrison MD  Physician Signature: See electronic signature associated with these discharge orders.  Date: 5/25/2019     Weight bearing status   Order Comments: Weight bearing as tolerated with direct standing pressure in CAM boot. The patient may not apply walking weight for at least 6 weeks following surgery.     Activity   Order Comments: Your activity upon discharge: activity as tolerated and ambulate  in house. Do not apply weight on foot while walking. OK to put weight on right foot while standing. Remove CAM boot 3 times a day to work on ankle ROM.     Order Specific Question Answer Comments   Is discharge order? Yes      Wound care and dressings   Order Comments: Instructions to care for your wound at home: keep wound clean and dry. Leave current dressings on for 2 days, then remove and cover with clean gauze and tape (change daily). If showering, wrap right leg with plastic bag and tape to keep it dry. At 7 days after surgery, OK to let water and soap run over the incisions but no scrubbing or immersion in water for 2 weeks after surgery.     Diet   Order Comments: Follow this diet upon discharge: Orders Placed This Encounter      Advance Diet as Tolerated: Regular Diet Adult     Order Specific Question Answer Comments   Is discharge order? Yes          Bj Dennis, PGY4

## 2019-05-25 NOTE — PROGRESS NOTES
Paged by RN RE pt's home celebrex. Pt wondering if she can take this now. She appears to have  and celebrex both on her home med list.     For now, we must use ASA for DVT PPX per specified duration by Ortho. However, RN will discuss polypharmacy with the patient. She should not be taking both ASA and Celebrex daily. Once the DVT PPX duration is concluded, patient can chose to take either of the meds for osteoarthritis, but I advised against taking both NSAIDs on the same day.     I do not see any CAD, PAD or ischemic stroke Hx for the patient (in our chart or per CareEverywhere, she just had a negative 6/2018 stress test at ), so she likely does not have a cardiac/neuro indication for daily aspirin.     Charla Ferris

## 2019-05-25 NOTE — PROGRESS NOTES
"Orthopedic Surgery Progress Note    Subjective / Interval Events:   CAM boot received yesterday. Pain controlled. Discussed importance of moving her ankle to prevent stiffness postop.    Objective:   Vital Signs Temp (24hrs), Av.8  F (36.6  C), Min:95.9  F (35.5  C), Max:99.9  F (37.7  C)    /41 (BP Location: Left arm)   Pulse 55   Temp 96.2  F (35.7  C) (Oral)   Resp 16   Ht 1.626 m (5' 4\")   Wt 79.4 kg (175 lb 0.7 oz)   SpO2 95%   BMI 30.05 kg/m      Physical Examination   General: no acute distress  Resp: Nonlabored breathing  RLE: Ankle anterior & posterior incisions c/d/i.     No active/expressible drainage. New dressings placed.    Extremity elevated    Wiggling toes; SILT toes    Toes warm and perfused    Labs (Last 24 hour):   Lab Results   Component Value Date    HGB 11.7 2019     Lab Results   Component Value Date    BUN 21 2019     2019    CO2 29 2019    ANIONGAP 5 2019     All cultures:  No results for input(s): CULT in the last 168 hours.  Assessment / Plan:   This is a 73 year old female s/p right TAA on 2019 by Dr. Garrison    Pain control; continue to wean medications as tolerated  CV: Monitor HR/BP  Resp: Encourage IS  Heme/ID: Monitor Hgb; 11.7 this morning  GI/: diet as tolerated  PT/OT  Converted to CAM boot    Remove for ankle ROM  Post-op antibiotics  DVT ppx: aspirin    Plan for discharge home today    Bj Dennis  Orthopaedic PGY4  Pager: [phone]      "

## 2019-05-25 NOTE — PLAN OF CARE
Discharge Planner PT   Patient plan for discharge: Home with assist from family  Current status: Patient IND for bed mobility. Transfers sit<>stand with SBA to knee scooter x3, showing good safety and stability. Her nephew will be bumping her up platform stairs in w/c at home, has done this in past.    At this time, PT goals met and no further mobility concerns. PT will complete orders and patient appropriate to discharge home from a mobility stand point.  Barriers to return to prior living situation: No mobility concerns.  Recommendations for discharge: Home with assist from family and HHPT.  Rationale for recommendations: Patient doing well, safe to discharge home with assist from family and HHPT.       Entered by: Gina Barber 05/25/2019 11:29 AM

## 2019-05-25 NOTE — PROGRESS NOTES
Care Coordinator Progress Note    Admission Date/Time:  5/23/2019  Attending MD:  Anson Garrison MD    Data  Chart reviewed, discussed with interdisciplinary team.   Patient was admitted for: Status post surgery.    Concerns with insurance coverage for discharge needs: None.  Current Living Situation: Patient lives alone.  Support System: Involved  Services Involved: Home Care  Transportation at Discharge: Family or friend will provide  Transportation to Medical Appointments:   - Name of caregiver: malathi De La Cruz  Barriers to Discharge: NA    Coordination of Care and Referrals: Provided patient/family with options for Home Care.        Assessment  Spoke with patient on the phone. Home care referral sent to [facility]. Start of home care with be 5/28/2019. No further discharge concerns. All therapy orders completed and faxed. RNCC available as needed.    [facility]  Phone [phone]  Fax [phone]     Plan  Anticipated Discharge Date:  5/25/2019  Anticipated Discharge Plan:  Home with home care.    Nikkie Wise RN, BSN  Care Coordinator,   Phone [phone]  Pager [phone]

## 2019-05-25 NOTE — PLAN OF CARE
Pt is A&Ox4. VSS. LS clear, on RA. BS active, LBM 5/22/2019. Voiding well. Pain managed with oxycodone 5mg. R ankle surgical dressing is c/d/I. CAM boot on. Pt still has numbness in surgical leg. Pt up with 1 assist. Continue to monitor.

## 2019-05-25 NOTE — PLAN OF CARE
VS: VSS. Temp of 100.5 at 1800, back to 98.8 at 2200   O2: >90% on RA   Output: Voiding w/o pain or difficulty. Uses commode   Last BM: 5/22/19, passing gas, fluids encouraged. Drank prune juice   Activity: NWB RLE. Up with 1 assist. Uses walker to pivot to commode. Uses scooter to ambulate in hallways.    Up for meals? No   Skin: Intact ex incision   Pain: Stated that her foot still felt a little numb but started to have some aching pain. Took 5 mg of oxycodone.    CMS: Baseline numbness in BLE   Dressing: CDI   Diet: Regular, tolerating well ate a late dinner.    LDA: PIV in L hand saline locked   Equipment: Scooter, walker, IV pole, Wedge pillow, CAM boot    Plan: Anticipated discharge home tomorrow   Additional Info: Pt has leg elevated on wedge pillow.          used

## 2019-05-25 NOTE — PLAN OF CARE
Pt. discharged at 1610PM to home, was accompanied by her niece, and left with personal belongings. Pt. received complete discharge paperwork and PO medications as filled by discharge pharmacy. Pt. was given times of last dose for all discharge medications in writing on discharge medication sheets. Discharge teaching included PO medication, pain management, activity restrictions, dressing changes, and signs and symptoms of infection. Dressing supplies sent home. Pt. to follow up with  on July 15, 2019. Pt. had no further questions at the time of discharge and no unmet needs were identified.

## 2019-05-25 NOTE — PLAN OF CARE
Physical Therapy Discharge Summary    Reason for therapy discharge:    All goals and outcomes met, no further needs identified.    Progress towards therapy goal(s). See goals on Care Plan in [facility] electronic health record for goal details.  Goals met    Therapy recommendation(s):    Continued therapy is recommended.  Rationale/Recommendations:  HHPT.

## 2019-05-25 NOTE — PROGRESS NOTES
[facility]    Medicine Progress Note - Hospitalist Service       Date of Admission:  5/23/2019  Assessment & Plan     Status post right total ankle arthroplasty by Dr. Garrison's team.   #  Hypertension, normally stable, on twice daily metoprolol 50 mg bid and daily hydrochlorothiazide. resume on dc  # Type 2 diabetes, stable prior to admission, on low-dose oral metformin. 500 mg bid  #  Hypothyroidism, stable on replacement therapy.   #  Hyperlipidemia. ; on zocor 20 mg   # GERD stable on daily PPI therapy  #Depression ; on sertraline 100 mg daily   # No known cardiopulmonary disease with history of negative cardiac stress test in 2018.            Diet: Advance Diet as Tolerated: Regular Diet Adult  Diet    DVT Prophylaxis: per ortho   Baugh Catheter: not present  Code Status: Full Code           The patient's care was discussed with the RN    Saira Perez MD  Hospitalist Service  [facility], [place]    ______________________________________________________________________    Interval History   LGF   Improved now  Wants to go home   No cp or sob    Data reviewed today: I reviewed all medications, new labs and imaging results over the last 24 hours.    Physical Exam   Vital Signs: Temp: 98.8  F (37.1  C) Temp src: Oral BP: 129/45   Heart Rate: 71 Resp: 16 SpO2: 93 % O2 Device: None (Room air)    Weight: 175 lbs .72 oz  Neck ; supple , no JVD  Chest ; equal BS bilaterally , no rales or rhonchi   CVS; RRR, no murmur /rubs or gallops  GI ; soft abdomen, non tender, BS positive  Left ankle dressed

## 2019-05-25 NOTE — PLAN OF CARE
5/25  07:00-15:30  VS: Stable   O2 Sats 95% on room air   Lung Sounds CTA, denies chest pain/SOB  IS used independently   Output: Voids spontaneously without difficulty   Bowels/BM BS + all quadrants, + flatus  Last BM 5/22   Activity Stand by assist with transfers, uses knee walker when ambulating   Skin: Intact except surgical incision Rt foot/ankle   Pain: Negligible pain, declined pain med when due   Neuro/CMS: Intact   Dressing: Gauze dressing changed by ortho this morning   Diet: regular   LDA: PIV saline locked    Equipment: CAM boot  Knee walker  PCD  Bedside commode   Plan: Discharge home this afternoon.   Additional Info:

## 2019-05-25 NOTE — PLAN OF CARE
Discharge Planner OT   Patient plan for discharge: home, hopefully today  Current status: SBA transfer bed <> knee scooter, with VC for applying brakes.  Toilet transfer to commode overlay using scooter and 2WW to navigate small spaces, with VC for placement and brakes.  Barriers to return to prior living situation: NWB with transfers, safety with transfers  Recommendations for discharge: home with Home OT safety evaluation  Rationale for recommendations: patient progressing well, anticipate she will be able to discharge to home with Home OT evaluation to assess home safety, set-up, and ADLs, as patient has unique home set-up.       Entered by: SHAYLEE LI 05/25/2019 9:55 AM          Occupational Therapy Discharge Summary    Reason for therapy discharge:    All goals and outcomes met, no further needs identified.    Progress towards therapy goal(s). See goals on Care Plan in [facility] electronic health record for goal details.  Goals met    Therapy recommendation(s):    Continued therapy is recommended.  Rationale/Recommendations:  Home OT for safety evaluation.

## 2019-05-27 ENCOUNTER — PATIENT OUTREACH (OUTPATIENT)
Dept: CARE COORDINATION | Facility: CLINIC | Age: 74
End: 2019-05-27

## 2019-05-28 ENCOUNTER — DOCUMENTATION ONLY (OUTPATIENT)
Dept: OTHER | Facility: CLINIC | Age: 74
End: 2019-05-28

## 2019-05-28 NOTE — PROGRESS NOTES
[facility]: Post-Discharge Note  SITUATION                                                      Admission:    Admission Date: 05/23/19   Reason for Admission: Primary Osteoarthritis Of Right Ankle  Discharge:   Discharge Date: 05/25/19  Discharge Diagnosis: Primary Osteoarthritis Of Right Ankle  Discharge Service: Orthopedics     BACKGROUND                                                      BRIEF HISTORY:  This is a 73 year old female who presented with right ankle arthritis refractory to conservative management. She elected to proceed with total ankle arthroplasty after discussing the risks, benefits, and alternatives.     HOSPITAL COURSE:    Surgery was uncomplicated. Silke Francis has done well post-operatively. Medicine was consulted post operatively to aid in management of medical comorbidities. See final recommendations below. The patient received routine nursing cares and is medically stable. Vital signs are stable. The patient is tolerating a regular diet without GI distress/nausea or vomiting. Voiding spontaneously. All PT/OT goals have been met for safe mobility. Pain is now controlled on oral medications which will be available on discharge. Stool softeners have been used while taking pain medications to help prevent constipation. Silke Francis is deemed medically safe to discharge.     ASSESSMENT      Discharge Assessment  Patient reports symptoms are: Improved  Does the patient have all of their medications?: Yes  Does patient know what their new medications are for?: Yes  Does patient have a follow-up appointment scheduled?: Yes  Does patient have any other questions or concerns?: No    Post-op  Did the patient have surgery or a procedure: Yes  Drainage: No  Bleeding: none  Fever: No  Chills: No  Redness: No  Warmth: No  Swelling: Yes(Mild, improving each day)  Incision site pain: No  Eating & Drinking: eating and drinking without complaints/concerns  PO Intake: regular  diet  Bowel Function: normal  Urinary Status: voiding without complaint/concerns    PLAN                                                      Outpatient Plan:      You will follow up with Dr [facility] at 2 weeks (Aleena Ibrahim) and 6 weeks (Dr Garrison)    Future Appointments   Date Time Provider Department Center   6/6/2019 11:15 AM Aleena Ibrahim APRN [facility]   7/15/2019  2:15 PM Anson Garrison MD [facility]           Gabriela Mathews, [facility]

## 2019-05-30 DIAGNOSIS — Z96.661 HISTORY OF TOTAL REPLACEMENT OF RIGHT ANKLE: Primary | ICD-10-CM

## 2019-05-31 NOTE — OP NOTE
Operation: Right Total Ankle Arthroplasty     Indication:  Right Ankle Osteoarthritis     Surgeon: Dr ASHLIE Garrison MD,     Assistant: Dr JEFF Diallo MD, [facility]OO[facility]UN        DESCRIPTION OF PROCEDURE:  Under spinal anesthesia in supine position, the right leg knee was prepped and draped in the usual sterile fashion.  Pause for the cause occurred and all present were in agreement.      After limb exsanguination, we made an anterior midline incision, utilizing the interval between tibialis anterior and the neurovascular bundle, which were both protected throughout the procedure. The two layers of the extensor retinaculum were incised longitudinally for later repair. We then used Bovie as well as a periosteal elevator to carefully subperiosteally dissect about the talar neck and the distal anterior tibia, through capsule, to reveal the ankle joint. The arthritis and osteophytes encountered were far more extensive than suspected from the pre operative imagining.     After adequate exposure had been obtained, we started with the tibial side. The custom jig was placed on the anterior tibial surface, with good fit, and wires used to secure it in place. The location of the planned resection cut was checked using xray and found to be satisfactory. We cut the tibia using a size 3 jig, and removed the anterior portion of this block of bone.     We then had sufficient space to cut the talus. Again, the patient specific jig was placed and secured with K wires. We checked our planned cut and performed our resection. Our alignment and gap was appropriate.     We then returned to the tibia side, positioning the custom jig for stem positioning, against the anterior tibial surface. This fitted well, and allowed application of the leg snyder, marking of the medial plantar surface skin, incision and introduction of the trocar. The drill traversing the sub talar joint, up into the tibia, was checked on XR in both planes and aligned well.       Stem implantation was then performed. Initially a 14 mm stem tip and 16 mm section were implanted, then a further 1 x 14 and 1x 16 mm sections and finally the 16 mm section for tray impaction. A Size 2 Long Tibia was templated, XR'ed and selected, and then implanted, aligning the anterior anti-rotation hole with the previously drilled slot.      After tibial implantation, a trial talus and liner was tested, a 10 mm liner being found to be most appropriate. We prepared the talus for lug holes in the usual fashion, and implanted a size 2 talar component with a 10 mm stem, this was again checked on XR to ensure adequate impaction.     Finally, the specialised device for tray insertion was used to deliver the size 8 tray. Xray images were satisfactory.      At this point the achilles tendon was indeed noted to be tight, hence a 3 level Eliecer-type release was performed, until adequate dorsiflexion was achieved.      Wounds were irrigated copiously, a deep drain placed,  and closed with 2/0 vicryl, 3/0 monocryl, and the posterior/inferior wounds with Nylon 3/0 . We dressed all wounds with steri strips, adaptec, gauze, ABDs, and cast padding and ACE to create a bulky dressing.         The patient returned to recovery in good condition.          - Dr. Miguelito Diallo (Orthopaedic Fellow)

## 2019-06-03 ENCOUNTER — TELEPHONE (OUTPATIENT)
Dept: ORTHOPEDICS | Facility: CLINIC | Age: 74
End: 2019-06-03

## 2019-06-03 NOTE — TELEPHONE ENCOUNTER
[facility] Call Center    Phone Message    May a detailed message be left on voicemail: yes    Reason for Call: Other: Pt is calling to request a refill for the Oxycodone that she can  on 6/6/2019 since she has an appt with Aleena Ibrahim that day. In the mean time, Pt was wondering what she can take for her pain since she will be out of the medication. Please follow up with Pt.       Action Taken: Message routed to:  [facility] ([facility] Ortho

## 2019-06-03 NOTE — TELEPHONE ENCOUNTER
"Returned call to Silke about her incision/feeling s/p TAA    She reports the home health nurse was there Friday and evaluated and told her \"everything looked normal\" and the blisters are not harmful. She denies drainage and pain is well controlled. She had no other questions or concerns and I will see her in clinic this Thursday.  "

## 2019-06-04 NOTE — TELEPHONE ENCOUNTER
Pt was phoned back by RN.  We discussed process for getting refills for pain medications.  Pt states she has 4 tablets left of oxycodone, she is rationing, she states the pain is not horrible but she would like some to help her until her follow up appt in 2 days.  Pt was offered Tylenol with Codeine but states that doesn't work for her.  She is taking OTC tylenol along with oxycodone.    Pt states she will contact her local MD to ask for a small amount, about 4 tablets to help for the next two days.  We reviewed that our surgery clinic would want to continue pain management in general, as needed for 6 weeks postop, pt verbalized understanding.  Monique Jones RN

## 2019-06-06 ENCOUNTER — OFFICE VISIT (OUTPATIENT)
Dept: ORTHOPEDICS | Facility: CLINIC | Age: 74
End: 2019-06-06
Payer: MEDICARE

## 2019-06-06 ENCOUNTER — ANCILLARY PROCEDURE (OUTPATIENT)
Dept: GENERAL RADIOLOGY | Facility: CLINIC | Age: 74
End: 2019-06-06
Attending: NURSE PRACTITIONER
Payer: MEDICARE

## 2019-06-06 DIAGNOSIS — Z47.1 AFTERCARE FOLLOWING RIGHT ANKLE JOINT REPLACEMENT SURGERY: ICD-10-CM

## 2019-06-06 DIAGNOSIS — Z96.661 HISTORY OF RIGHT ANKLE JOINT REPLACEMENT: Primary | ICD-10-CM

## 2019-06-06 DIAGNOSIS — Z96.661 HISTORY OF TOTAL REPLACEMENT OF RIGHT ANKLE: ICD-10-CM

## 2019-06-06 DIAGNOSIS — Z98.890 STATUS POST SURGERY: ICD-10-CM

## 2019-06-06 DIAGNOSIS — Z96.661 AFTERCARE FOLLOWING RIGHT ANKLE JOINT REPLACEMENT SURGERY: ICD-10-CM

## 2019-06-06 RX ORDER — OXYCODONE HYDROCHLORIDE 5 MG/1
5-10 TABLET ORAL EVERY 6 HOURS PRN
Qty: 40 TABLET | Refills: 0 | Status: SHIPPED | OUTPATIENT
Start: 2019-06-06

## 2019-06-06 NOTE — LETTER
6/6/2019       RE: Silke Francis  [address] [place] [id number]     Dear Colleague,    Thank you for referring your patient, Silke Francis, [facility] the [facility]. Please see a copy of my visit note below.    DOS: 5/23/2019    Chetna is seen 2 weeks postop of a right total ankle arthroplasty.  She reports excellent pain control and relief compared to her pain she had preoperatively.  She is taking 2 oxycodone daily 1 in the morning and 1 at bedtime.  She is been in a cam boot and she is been nonweightbearing.  Her was boot and dressing were removed with that demonstrate well-healed incisions with healing superficial friction blisters from the Steri-Strips.  There are no focal areas of tenderness.  She has very minimal swelling.  No calf pain upon palpation.  She denies fever chills or shortness of breath.  She is taking an aspirin daily. Sutures were removed from the heel today without difficulty.    X-rays were taken which show good fit of the total ankle arthroplasty with ideal position with no evidence of acute lucency or fractures.    Diagnosis: Right total ankle arthroplasty    Plan:    At this time I did demonstrate she can now advance stretching using slow static stretching and dorsiflexion plantarflexion.  She will not do any inversion or eversion activities yet.  She can get the wound wet to do daily dressing changes per her comfort she will follow-up at her convenience in about 1 month's time for an AP lateral x-ray of her right ankle on arrival or sooner if she has any increasing pain problems or other complications.  I did tell Chetna that she can do direct standing with her cam boot on as long as she is not walking.    Aleena Ibrahim, KEVIN CNP

## 2019-06-06 NOTE — PROGRESS NOTES
DOS: 5/23/2019    Chetna is seen 2 weeks postop of a right total ankle arthroplasty.  She reports excellent pain control and relief compared to her pain she had preoperatively.  She is taking 2 oxycodone daily 1 in the morning and 1 at bedtime.  She is been in a cam boot and she is been nonweightbearing.  Her was boot and dressing were removed with that demonstrate well-healed incisions with healing superficial friction blisters from the Steri-Strips.  There are no focal areas of tenderness.  She has very minimal swelling.  No calf pain upon palpation.  She denies fever chills or shortness of breath.  She is taking an aspirin daily. Sutures were removed from the heel today without difficulty.    X-rays were taken which show good fit of the total ankle arthroplasty with ideal position with no evidence of acute lucency or fractures.    Diagnosis: Right total ankle arthroplasty    Plan:    At this time I did demonstrate she can now advance stretching using slow static stretching and dorsiflexion plantarflexion.  She will not do any inversion or eversion activities yet.  She can get the wound wet to do daily dressing changes per her comfort she will follow-up at her convenience in about 1 month's time for an AP lateral x-ray of her right ankle on arrival or sooner if she has any increasing pain problems or other complications.  I did tell Chetna that she can do direct standing with her cam boot on as long as she is not walking.

## 2019-06-06 NOTE — NURSING NOTE
Reason For Visit:   Chief Complaint   Patient presents with     Surgical Followup     2 week pop right TAA.  DOS: 5/23/2019       There were no vitals taken for this visit.    Pain Assessment  Patient Currently in Pain: Denies(Just some discomfert)    Dyan Maharaj ATC

## 2019-06-26 ENCOUNTER — TELEPHONE (OUTPATIENT)
Dept: ORTHOPEDICS | Facility: CLINIC | Age: 74
End: 2019-06-26

## 2019-06-27 DIAGNOSIS — Z96.661 HISTORY OF ARTHROPLASTY OF RIGHT ANKLE: Primary | ICD-10-CM

## 2019-07-08 ENCOUNTER — OFFICE VISIT (OUTPATIENT)
Dept: ORTHOPEDICS | Facility: CLINIC | Age: 74
End: 2019-07-08
Payer: MEDICARE

## 2019-07-08 ENCOUNTER — ANCILLARY PROCEDURE (OUTPATIENT)
Dept: GENERAL RADIOLOGY | Facility: CLINIC | Age: 74
End: 2019-07-08
Attending: NURSE PRACTITIONER
Payer: MEDICARE

## 2019-07-08 DIAGNOSIS — Z96.661 ANKLE JOINT REPLACEMENT STATUS, RIGHT: Primary | ICD-10-CM

## 2019-07-08 DIAGNOSIS — Z96.661 HISTORY OF ARTHROPLASTY OF RIGHT ANKLE: ICD-10-CM

## 2019-07-08 NOTE — NURSING NOTE
Reason For Visit:   Chief Complaint   Patient presents with     RECHECK     right TAA with tenodachilles lengthening DOS: 5/23/19       There were no vitals taken for this visit.    Pain Assessment  Patient Currently in Pain: Denies(Patients describes the ankle to be very tight and stiff but not painful)    Chela Burch, ATC

## 2019-07-08 NOTE — PROGRESS NOTES
"DOS: 5/23/2019  R TAA    Silke is seen 6 weeks post op of her R TAA. She states she has no pain. She has been standing weight using a CAM boot but feels \"she is ready to start walking\". Incision is healed/intact. No calf pain. Minimal effusion. Alignment is neutral. She has full sensation without neuro deficits.    Xrays were taken today show good fit of the total ankle arthroplasty without changes when compared to serial xrays without acute fractures.    Dx:  1. R TAA    Plan:  1. Issued an air splint to transition out of CAM boot. She will start formal PT for gait training and stretching.  2. Follow up in 4-6 weeks for a 2v xray of her right ankle on arrival.  "

## 2019-07-08 NOTE — LETTER
"7/8/2019       RE: Silke Francis  [address] [place] [id number]     Dear Colleague,    Thank you for referring your patient, Silke Francis, to the [facility] at [facility]. Please see a copy of my visit note below.    DOS: 5/23/2019  R TAA    Silke is seen 6 weeks post op of her R TAA. She states she has no pain. She has been standing weight using a CAM boot but feels \"she is ready to start walking\". Incision is healed/intact. No calf pain. Minimal effusion. Alignment is neutral. She has full sensation without neuro deficits.    Xrays were taken today show good fit of the total ankle arthroplasty without changes when compared to serial xrays without acute fractures.    Dx:  1. R TAA    Plan:  1. Issued an air splint to transition out of CAM boot. She will start formal PT for gait training and stretching.  2. Follow up in 4-6 weeks for a 2v xray of her right ankle on arrival.    Again, thank you for allowing me to participate in the care of your patient.      Sincerely,    Aleena Ibrahim, KEVIN CNP      "

## 2019-07-10 ENCOUNTER — TELEPHONE (OUTPATIENT)
Dept: ORTHOPEDICS | Facility: CLINIC | Age: 74
End: 2019-07-10

## 2019-07-10 NOTE — TELEPHONE ENCOUNTER
[facility] Call Center    Phone Message    May a detailed message be left on voicemail: yes    Reason for Call: Order(s): Other: PT Order  Reason for requested: Per Sophia Pt would like to have therapy done at [facility]. Please fax order to [facility] at: [phone], Attn: Rehab. Please call with any questions.  Date needed: Asap  Provider name: Dr. Garrison      Action Taken: Message routed to:  [facility] Ortho

## 2019-07-17 ENCOUNTER — TRANSFERRED RECORDS (OUTPATIENT)
Dept: HEALTH INFORMATION MANAGEMENT | Facility: CLINIC | Age: 74
End: 2019-07-17

## 2019-08-12 DIAGNOSIS — M25.571 RIGHT ANKLE PAIN: Primary | ICD-10-CM

## 2019-08-19 ENCOUNTER — OFFICE VISIT (OUTPATIENT)
Dept: ORTHOPEDICS | Facility: CLINIC | Age: 74
End: 2019-08-19
Payer: MEDICARE

## 2019-08-19 ENCOUNTER — ANCILLARY PROCEDURE (OUTPATIENT)
Dept: GENERAL RADIOLOGY | Facility: CLINIC | Age: 74
End: 2019-08-19
Attending: ORTHOPAEDIC SURGERY
Payer: MEDICARE

## 2019-08-19 DIAGNOSIS — M25.571 RIGHT ANKLE PAIN: ICD-10-CM

## 2019-08-19 DIAGNOSIS — Z96.661 ANKLE JOINT REPLACEMENT STATUS, RIGHT: Primary | ICD-10-CM

## 2019-08-19 NOTE — LETTER
"8/19/2019       RE: Silke Francis  [address] [place] [id number]     Dear Colleague,    Thank you for referring your patient, Silke Francis, to the [facility]. Please see a copy of my visit note below.    DOS: R TAA 5/23/2019    Silke is seen 10 weeks postop of a right total ankle arthroplasty.  She reports that she is doing well with marked improvement compared to her preoperative pain and function.  She does state some achiness and tightness at times.  She is taking nothing for pain on a regular basis.  There is no swelling.  Her incision is healed and benign.  She is doing formal PT 2 times per week with a good result.  She is walking with minimal limp and arises from the chair unguarded.  She does use a cane for long distance walking for \"balance\".  Alignment is neutral.  Dorsiflexion is symmetrical to contralateral side along with plantarflexion.    X-rays were taken which show ideal position of the total ankle arthroplasty with no acute lucency or change compared to serial x-rays.    Diagnosis:    1.  Right total ankle arthroplasty    Plan:  1.  We will have her continue with her physical therapy for balance and stretching and strengthening.  She will continue to advance her activities as she feels comfortable letting pain be her guide.  We recommended lifelong prophylactic antibiotics for all dental procedures.  She will follow-up with us at the one year lolita or sooner if she is increasing pain problems or other complications.    I, Dr. Lolita Garrison, agree with above plan of care and examined the patient.    Again, thank you for allowing me to participate in the care of your patient.      Sincerely,  Lolita Garrison MD  "

## 2019-08-19 NOTE — PROGRESS NOTES
"DOS: R TAA 5/23/2019    Silke is seen 10 weeks postop of a right total ankle arthroplasty.  She reports that she is doing well with marked improvement compared to her preoperative pain and function.  She does state some achiness and tightness at times.  She is taking nothing for pain on a regular basis.  There is no swelling.  Her incision is healed and benign.  She is doing formal PT 2 times per week with a good result.  She is walking with minimal limp and arises from the chair unguarded.  She does use a cane for long distance walking for \"balance\".  Alignment is neutral.  Dorsiflexion is symmetrical to contralateral side along with plantarflexion.    X-rays were taken which show ideal position of the total ankle arthroplasty with no acute lucency or change compared to serial x-rays.    Diagnosis:    1.  Right total ankle arthroplasty    Plan:    1.  We will have her continue with her physical therapy for balance and stretching and strengthening.  She will continue to advance her activities as she feels comfortable letting pain be her guide.  We recommended lifelong prophylactic antibiotics for all dental procedures.  She will follow-up with us at the one year lolita or sooner if she is increasing pain problems or other complications.    I, Dr. Lolita Garrison, agree with above plan of care and examined the patient.  "

## 2019-08-19 NOTE — NURSING NOTE
Reason For Visit:   Chief Complaint   Patient presents with     RECHECK     Right Total Ankle Arthroplasty With Tendoachilles Lengthening DOS: 5/23/19       There were no vitals taken for this visit.    Pain Assessment  Patient Currently in Pain: Denies(Patient describes discomfort but not pain )    Chela Burch, ATC

## 2020-03-11 ENCOUNTER — HEALTH MAINTENANCE LETTER (OUTPATIENT)
Age: 75
End: 2020-03-11

## 2020-08-05 DIAGNOSIS — Z96.661 ANKLE JOINT REPLACEMENT STATUS, RIGHT: Primary | ICD-10-CM

## 2021-01-03 ENCOUNTER — HEALTH MAINTENANCE LETTER (OUTPATIENT)
Age: 76
End: 2021-01-03

## 2021-04-25 ENCOUNTER — HEALTH MAINTENANCE LETTER (OUTPATIENT)
Age: 76
End: 2021-04-25

## 2021-10-10 ENCOUNTER — HEALTH MAINTENANCE LETTER (OUTPATIENT)
Age: 76
End: 2021-10-10

## 2022-05-21 ENCOUNTER — HEALTH MAINTENANCE LETTER (OUTPATIENT)
Age: 77
End: 2022-05-21

## 2022-09-18 ENCOUNTER — HEALTH MAINTENANCE LETTER (OUTPATIENT)
Age: 77
End: 2022-09-18

## 2023-06-04 ENCOUNTER — HEALTH MAINTENANCE LETTER (OUTPATIENT)
Age: 78
End: 2023-06-04

## (undated) DEVICE — LIGHT HANDLE X2

## (undated) DEVICE — DRAPE C-ARMOR 5 SIDED 5523

## (undated) DEVICE — BLADE SAW SAGITTAL STRK 20.7X85X0.89MM 2108-109-000S13

## (undated) DEVICE — SOL ISOPROPYL RUBBING ALCOHOL USP 70% 4OZ HDX-20 I0020

## (undated) DEVICE — DRAPE IOBAN INCISE 23X17" 6650EZ

## (undated) DEVICE — NARROW SAW BLADE

## (undated) DEVICE — GLOVE PROTEXIS BLUE W/NEU-THERA 7.5  2D73EB75

## (undated) DEVICE — GLOVE PROTEXIS W/NEU-THERA 8.0  2D73TE80

## (undated) DEVICE — LINEN ORTHO PACK 5446

## (undated) DEVICE — SU MONOCRYL 3-0 PS-2 18" UND Y497G

## (undated) DEVICE — PREP DURAPREP 26ML APL 8630

## (undated) DEVICE — HOOD FLYTE W/PEELAWAY 408-800-100

## (undated) DEVICE — DRAPE C-ARM W/STRAPS 42X72" 07-CA104

## (undated) DEVICE — GLOVE PROTEXIS BLUE W/NEU-THERA 8.0  2D73EB80

## (undated) DEVICE — WIDE SAW BLADE

## (undated) DEVICE — GOWN XLG DISP 9545

## (undated) DEVICE — Device

## (undated) DEVICE — INBONE SCREW, BONE REMOVER

## (undated) DEVICE — SUCTION IRR SYSTEM W/O TIP INTERPULSE HANDPIECE 0210-100-000

## (undated) DEVICE — SUCTION MANIFOLD DORNOCH ULTRA CART UL-CL500

## (undated) DEVICE — STRAP KNEE/BODY 31143004

## (undated) DEVICE — LINEN GOWN X4 5410

## (undated) DEVICE — SU ETHILON 3-0 PS-1 18" 1663H

## (undated) DEVICE — CAST PADDING 4" STERILE 9044S

## (undated) DEVICE — DRSG ABDOMINAL 07 1/2X8" 7197D

## (undated) DEVICE — SU MONOCRYL 2-0 SH 27" UND Y417H

## (undated) DEVICE — BONE CLEANING TIP INTERPULSE  0210-010-000

## (undated) DEVICE — DRSG ADAPTIC 3X8" 6113

## (undated) DEVICE — CAST PLASTER SPLINT 5X30" 7395

## (undated) DEVICE — BLADE KNIFE SURG 15 371115

## (undated) DEVICE — SYR 50ML LL W/O NDL 309653

## (undated) DEVICE — SOL NACL 0.9% INJ 1000ML BAG 2B1324X

## (undated) DEVICE — DRSG KERLIX FLUFFS X5

## (undated) DEVICE — IMPLANTABLE DEVICE: Type: IMPLANTABLE DEVICE | Status: NON-FUNCTIONAL

## (undated) DEVICE — LINEN TOWEL PACK X5 5464

## (undated) DEVICE — PEN MARKING SKIN W/LABELS 31145918

## (undated) DEVICE — DECANTER TRANSFER DEVICE 2008S

## (undated) DEVICE — SOL WATER IRRIG 1000ML BOTTLE 2F7114

## (undated) DEVICE — ESU GROUND PAD UNIVERSAL W/O CORD

## (undated) DEVICE — CAST PADDING 6" STERILE 9046S

## (undated) DEVICE — DRSG STERI STRIP 1X5" R1548

## (undated) DEVICE — SPONGE LAP 18X18" X8435

## (undated) DEVICE — NDL SPINAL 18GA 3.5" 405184

## (undated) DEVICE — SOL NACL 0.9% IRRIG 1000ML BOTTLE 2F7124

## (undated) DEVICE — SU VICRYL 2-0 CT-2 27" UND J269H

## (undated) DEVICE — PACK SET-UP STD 9102

## (undated) DEVICE — TOURNIQUET CUFF 34" REPRO BROWN 60-7070-106

## (undated) DEVICE — PACK LOWER EXTREMITY RIVERSIDE SOP32LEFSX

## (undated) DEVICE — SU VICRYL 0 CT-1 27" UND J260H

## (undated) RX ORDER — ACETAMINOPHEN 325 MG/1
TABLET ORAL
Status: DISPENSED
Start: 2019-05-23

## (undated) RX ORDER — PROPOFOL 10 MG/ML
INJECTION, EMULSION INTRAVENOUS
Status: DISPENSED
Start: 2019-05-23

## (undated) RX ORDER — ONDANSETRON 2 MG/ML
INJECTION INTRAMUSCULAR; INTRAVENOUS
Status: DISPENSED
Start: 2019-05-23

## (undated) RX ORDER — EPHEDRINE SULFATE 50 MG/ML
INJECTION, SOLUTION INTRAMUSCULAR; INTRAVENOUS; SUBCUTANEOUS
Status: DISPENSED
Start: 2019-05-23

## (undated) RX ORDER — PHENYLEPHRINE HCL IN 0.9% NACL 1 MG/10 ML
SYRINGE (ML) INTRAVENOUS
Status: DISPENSED
Start: 2019-05-23

## (undated) RX ORDER — BUPIVACAINE HYDROCHLORIDE AND EPINEPHRINE 5; 5 MG/ML; UG/ML
INJECTION, SOLUTION PERINEURAL
Status: DISPENSED
Start: 2019-05-23

## (undated) RX ORDER — CEFAZOLIN SODIUM 2 G/100ML
INJECTION, SOLUTION INTRAVENOUS
Status: DISPENSED
Start: 2019-05-23

## (undated) RX ORDER — GABAPENTIN 100 MG/1
CAPSULE ORAL
Status: DISPENSED
Start: 2019-05-23

## (undated) RX ORDER — LIDOCAINE HYDROCHLORIDE 20 MG/ML
INJECTION, SOLUTION EPIDURAL; INFILTRATION; INTRACAUDAL; PERINEURAL
Status: DISPENSED
Start: 2019-05-23

## (undated) RX ORDER — KETOROLAC TROMETHAMINE 30 MG/ML
INJECTION, SOLUTION INTRAMUSCULAR; INTRAVENOUS
Status: DISPENSED
Start: 2019-05-23

## (undated) RX ORDER — CELECOXIB 200 MG/1
CAPSULE ORAL
Status: DISPENSED
Start: 2019-05-23

## (undated) RX ORDER — FENTANYL CITRATE 50 UG/ML
INJECTION, SOLUTION INTRAMUSCULAR; INTRAVENOUS
Status: DISPENSED
Start: 2019-05-23